# Patient Record
Sex: MALE | Race: BLACK OR AFRICAN AMERICAN | NOT HISPANIC OR LATINO | Employment: OTHER | URBAN - METROPOLITAN AREA
[De-identification: names, ages, dates, MRNs, and addresses within clinical notes are randomized per-mention and may not be internally consistent; named-entity substitution may affect disease eponyms.]

---

## 2021-08-31 ENCOUNTER — HOSPITAL ENCOUNTER (EMERGENCY)
Facility: HOSPITAL | Age: 56
Discharge: HOME | End: 2021-08-31
Attending: EMERGENCY MEDICINE
Payer: MEDICAID

## 2021-08-31 VITALS
DIASTOLIC BLOOD PRESSURE: 72 MMHG | OXYGEN SATURATION: 97 % | TEMPERATURE: 98 F | RESPIRATION RATE: 16 BRPM | SYSTOLIC BLOOD PRESSURE: 131 MMHG | HEART RATE: 70 BPM

## 2021-08-31 DIAGNOSIS — R41.82 AMS (ALTERED MENTAL STATUS): Primary | ICD-10-CM

## 2021-08-31 DIAGNOSIS — F10.920 ALCOHOLIC INTOXICATION WITHOUT COMPLICATION: ICD-10-CM

## 2021-08-31 LAB
ALBUMIN SERPL BCP-MCNC: 3.9 G/DL (ref 3.5–5.2)
ALP SERPL-CCNC: 56 U/L (ref 55–135)
ALT SERPL W/O P-5'-P-CCNC: 17 U/L (ref 10–44)
ANION GAP SERPL CALC-SCNC: 12 MMOL/L (ref 8–16)
AST SERPL-CCNC: 27 U/L (ref 10–40)
BASOPHILS # BLD AUTO: 0.01 K/UL (ref 0–0.2)
BASOPHILS NFR BLD: 0.1 % (ref 0–1.9)
BILIRUB SERPL-MCNC: 0.4 MG/DL (ref 0.1–1)
BUN SERPL-MCNC: 22 MG/DL (ref 6–20)
CALCIUM SERPL-MCNC: 9.3 MG/DL (ref 8.7–10.5)
CHLORIDE SERPL-SCNC: 106 MMOL/L (ref 95–110)
CO2 SERPL-SCNC: 22 MMOL/L (ref 23–29)
CREAT SERPL-MCNC: 1 MG/DL (ref 0.5–1.4)
CTP QC/QA: YES
DIFFERENTIAL METHOD: ABNORMAL
EOSINOPHIL # BLD AUTO: 0 K/UL (ref 0–0.5)
EOSINOPHIL NFR BLD: 0.1 % (ref 0–8)
ERYTHROCYTE [DISTWIDTH] IN BLOOD BY AUTOMATED COUNT: 12.1 % (ref 11.5–14.5)
EST. GFR  (AFRICAN AMERICAN): >60 ML/MIN/1.73 M^2
EST. GFR  (NON AFRICAN AMERICAN): >60 ML/MIN/1.73 M^2
ETHANOL SERPL-MCNC: 256 MG/DL
GLUCOSE SERPL-MCNC: 70 MG/DL (ref 70–110)
HCT VFR BLD AUTO: 42.4 % (ref 40–54)
HGB BLD-MCNC: 14.5 G/DL (ref 14–18)
IMM GRANULOCYTES # BLD AUTO: 0.02 K/UL (ref 0–0.04)
IMM GRANULOCYTES NFR BLD AUTO: 0.3 % (ref 0–0.5)
LYMPHOCYTES # BLD AUTO: 1.3 K/UL (ref 1–4.8)
LYMPHOCYTES NFR BLD: 19.1 % (ref 18–48)
MCH RBC QN AUTO: 34.2 PG (ref 27–31)
MCHC RBC AUTO-ENTMCNC: 34.2 G/DL (ref 32–36)
MCV RBC AUTO: 100 FL (ref 82–98)
MONOCYTES # BLD AUTO: 0.4 K/UL (ref 0.3–1)
MONOCYTES NFR BLD: 5.5 % (ref 4–15)
NEUTROPHILS # BLD AUTO: 5.1 K/UL (ref 1.8–7.7)
NEUTROPHILS NFR BLD: 74.9 % (ref 38–73)
NRBC BLD-RTO: 0 /100 WBC
PLATELET # BLD AUTO: 256 K/UL (ref 150–450)
PMV BLD AUTO: 9.1 FL (ref 9.2–12.9)
POTASSIUM SERPL-SCNC: 3.7 MMOL/L (ref 3.5–5.1)
PROT SERPL-MCNC: 7.5 G/DL (ref 6–8.4)
RBC # BLD AUTO: 4.24 M/UL (ref 4.6–6.2)
SARS-COV-2 RDRP RESP QL NAA+PROBE: NEGATIVE
SODIUM SERPL-SCNC: 140 MMOL/L (ref 136–145)
WBC # BLD AUTO: 6.85 K/UL (ref 3.9–12.7)

## 2021-08-31 PROCEDURE — 82077 ASSAY SPEC XCP UR&BREATH IA: CPT | Performed by: EMERGENCY MEDICINE

## 2021-08-31 PROCEDURE — 99285 EMERGENCY DEPT VISIT HI MDM: CPT | Mod: CS,,, | Performed by: EMERGENCY MEDICINE

## 2021-08-31 PROCEDURE — 99285 PR EMERGENCY DEPT VISIT,LEVEL V: ICD-10-PCS | Mod: CS,,, | Performed by: EMERGENCY MEDICINE

## 2021-08-31 PROCEDURE — 93010 EKG 12-LEAD: ICD-10-PCS | Mod: ,,, | Performed by: INTERNAL MEDICINE

## 2021-08-31 PROCEDURE — 99284 EMERGENCY DEPT VISIT MOD MDM: CPT | Mod: 25

## 2021-08-31 PROCEDURE — 80053 COMPREHEN METABOLIC PANEL: CPT | Performed by: EMERGENCY MEDICINE

## 2021-08-31 PROCEDURE — 93010 ELECTROCARDIOGRAM REPORT: CPT | Mod: ,,, | Performed by: INTERNAL MEDICINE

## 2021-08-31 PROCEDURE — 85025 COMPLETE CBC W/AUTO DIFF WBC: CPT | Performed by: EMERGENCY MEDICINE

## 2021-08-31 PROCEDURE — 86803 HEPATITIS C AB TEST: CPT | Performed by: EMERGENCY MEDICINE

## 2021-08-31 PROCEDURE — U0002 COVID-19 LAB TEST NON-CDC: HCPCS | Performed by: EMERGENCY MEDICINE

## 2021-08-31 PROCEDURE — 63600175 PHARM REV CODE 636 W HCPCS: Performed by: EMERGENCY MEDICINE

## 2021-08-31 PROCEDURE — 93005 ELECTROCARDIOGRAM TRACING: CPT

## 2021-08-31 PROCEDURE — 96374 THER/PROPH/DIAG INJ IV PUSH: CPT

## 2021-08-31 PROCEDURE — 87389 HIV-1 AG W/HIV-1&-2 AB AG IA: CPT | Performed by: EMERGENCY MEDICINE

## 2021-08-31 RX ORDER — ONDANSETRON 2 MG/ML
4 INJECTION INTRAMUSCULAR; INTRAVENOUS
Status: COMPLETED | OUTPATIENT
Start: 2021-08-31 | End: 2021-08-31

## 2021-08-31 RX ADMIN — ONDANSETRON 4 MG: 2 INJECTION INTRAMUSCULAR; INTRAVENOUS at 04:08

## 2021-09-02 LAB
HCV AB SERPL QL IA: NEGATIVE
HIV 1+2 AB+HIV1 P24 AG SERPL QL IA: NEGATIVE

## 2023-03-04 ENCOUNTER — HOSPITAL ENCOUNTER (EMERGENCY)
Facility: HOSPITAL | Age: 58
Discharge: HOME | End: 2023-03-04
Attending: EMERGENCY MEDICINE
Payer: MEDICAID

## 2023-03-04 VITALS
DIASTOLIC BLOOD PRESSURE: 90 MMHG | HEART RATE: 79 BPM | OXYGEN SATURATION: 95 % | TEMPERATURE: 99 F | BODY MASS INDEX: 25.03 KG/M2 | RESPIRATION RATE: 18 BRPM | WEIGHT: 169 LBS | HEIGHT: 69 IN | SYSTOLIC BLOOD PRESSURE: 152 MMHG

## 2023-03-04 DIAGNOSIS — R07.9 CHEST PAIN: ICD-10-CM

## 2023-03-04 LAB
ALBUMIN SERPL BCP-MCNC: 3.8 G/DL (ref 3.5–5.2)
ALP SERPL-CCNC: 63 U/L (ref 55–135)
ALT SERPL W/O P-5'-P-CCNC: 48 U/L (ref 10–44)
ANION GAP SERPL CALC-SCNC: 14 MMOL/L (ref 8–16)
AST SERPL-CCNC: 78 U/L (ref 10–40)
BASOPHILS # BLD AUTO: 0.01 K/UL (ref 0–0.2)
BASOPHILS NFR BLD: 0.1 % (ref 0–1.9)
BILIRUB SERPL-MCNC: 1.2 MG/DL (ref 0.1–1)
BILIRUB UR QL STRIP: NEGATIVE
BNP SERPL-MCNC: 31 PG/ML (ref 0–99)
BUN SERPL-MCNC: 16 MG/DL (ref 6–20)
CALCIUM SERPL-MCNC: 10.2 MG/DL (ref 8.7–10.5)
CHLORIDE SERPL-SCNC: 100 MMOL/L (ref 95–110)
CLARITY UR REFRACT.AUTO: CLEAR
CO2 SERPL-SCNC: 24 MMOL/L (ref 23–29)
COLOR UR AUTO: YELLOW
CREAT SERPL-MCNC: 0.9 MG/DL (ref 0.5–1.4)
DIFFERENTIAL METHOD: ABNORMAL
EOSINOPHIL # BLD AUTO: 0 K/UL (ref 0–0.5)
EOSINOPHIL NFR BLD: 0 % (ref 0–8)
ERYTHROCYTE [DISTWIDTH] IN BLOOD BY AUTOMATED COUNT: 12.4 % (ref 11.5–14.5)
EST. GFR  (NO RACE VARIABLE): >60 ML/MIN/1.73 M^2
GLUCOSE SERPL-MCNC: 89 MG/DL (ref 70–110)
GLUCOSE UR QL STRIP: NEGATIVE
HCT VFR BLD AUTO: 41.3 % (ref 40–54)
HCV AB SERPL QL IA: NORMAL
HGB BLD-MCNC: 14.3 G/DL (ref 14–18)
HGB UR QL STRIP: NEGATIVE
HIV 1+2 AB+HIV1 P24 AG SERPL QL IA: NORMAL
IMM GRANULOCYTES # BLD AUTO: 0.03 K/UL (ref 0–0.04)
IMM GRANULOCYTES NFR BLD AUTO: 0.3 % (ref 0–0.5)
INFLUENZA A, MOLECULAR: NOT DETECTED
INFLUENZA B, MOLECULAR: NOT DETECTED
KETONES UR QL STRIP: ABNORMAL
LEUKOCYTE ESTERASE UR QL STRIP: NEGATIVE
LYMPHOCYTES # BLD AUTO: 0.6 K/UL (ref 1–4.8)
LYMPHOCYTES NFR BLD: 5.4 % (ref 18–48)
MCH RBC QN AUTO: 36.5 PG (ref 27–31)
MCHC RBC AUTO-ENTMCNC: 34.6 G/DL (ref 32–36)
MCV RBC AUTO: 105 FL (ref 82–98)
MONOCYTES # BLD AUTO: 0.4 K/UL (ref 0.3–1)
MONOCYTES NFR BLD: 4 % (ref 4–15)
NEUTROPHILS # BLD AUTO: 9.6 K/UL (ref 1.8–7.7)
NEUTROPHILS NFR BLD: 90.2 % (ref 38–73)
NITRITE UR QL STRIP: NEGATIVE
NRBC BLD-RTO: 0 /100 WBC
PH UR STRIP: 7 [PH] (ref 5–8)
PLATELET # BLD AUTO: 261 K/UL (ref 150–450)
PMV BLD AUTO: 9.1 FL (ref 9.2–12.9)
POTASSIUM SERPL-SCNC: 3.7 MMOL/L (ref 3.5–5.1)
PROT SERPL-MCNC: 8 G/DL (ref 6–8.4)
PROT UR QL STRIP: ABNORMAL
RBC # BLD AUTO: 3.92 M/UL (ref 4.6–6.2)
RSV AG BY MOLECULAR METHOD: NOT DETECTED
SARS-COV-2 RNA RESP QL NAA+PROBE: NOT DETECTED
SODIUM SERPL-SCNC: 138 MMOL/L (ref 136–145)
SP GR UR STRIP: 1.02 (ref 1–1.03)
TROPONIN I SERPL DL<=0.01 NG/ML-MCNC: 0.01 NG/ML (ref 0–0.03)
URN SPEC COLLECT METH UR: ABNORMAL
WBC # BLD AUTO: 10.64 K/UL (ref 3.9–12.7)

## 2023-03-04 PROCEDURE — 80053 COMPREHEN METABOLIC PANEL: CPT | Performed by: EMERGENCY MEDICINE

## 2023-03-04 PROCEDURE — 96374 THER/PROPH/DIAG INJ IV PUSH: CPT

## 2023-03-04 PROCEDURE — 99285 EMERGENCY DEPT VISIT HI MDM: CPT | Mod: 25

## 2023-03-04 PROCEDURE — 63600175 PHARM REV CODE 636 W HCPCS: Performed by: EMERGENCY MEDICINE

## 2023-03-04 PROCEDURE — 81003 URINALYSIS AUTO W/O SCOPE: CPT | Performed by: EMERGENCY MEDICINE

## 2023-03-04 PROCEDURE — 85025 COMPLETE CBC W/AUTO DIFF WBC: CPT | Performed by: EMERGENCY MEDICINE

## 2023-03-04 PROCEDURE — 83880 ASSAY OF NATRIURETIC PEPTIDE: CPT | Performed by: EMERGENCY MEDICINE

## 2023-03-04 PROCEDURE — 86803 HEPATITIS C AB TEST: CPT | Performed by: EMERGENCY MEDICINE

## 2023-03-04 PROCEDURE — 84484 ASSAY OF TROPONIN QUANT: CPT | Performed by: EMERGENCY MEDICINE

## 2023-03-04 PROCEDURE — 99284 PR EMERGENCY DEPT VISIT,LEVEL IV: ICD-10-PCS | Mod: CS,,, | Performed by: EMERGENCY MEDICINE

## 2023-03-04 PROCEDURE — 25000003 PHARM REV CODE 250: Performed by: EMERGENCY MEDICINE

## 2023-03-04 PROCEDURE — 87389 HIV-1 AG W/HIV-1&-2 AB AG IA: CPT | Performed by: EMERGENCY MEDICINE

## 2023-03-04 PROCEDURE — 0241U SARS-COV2 (COVID) WITH FLU/RSV BY PCR: CPT | Performed by: EMERGENCY MEDICINE

## 2023-03-04 PROCEDURE — 99284 EMERGENCY DEPT VISIT MOD MDM: CPT | Mod: CS,,, | Performed by: EMERGENCY MEDICINE

## 2023-03-04 PROCEDURE — 96361 HYDRATE IV INFUSION ADD-ON: CPT

## 2023-03-04 PROCEDURE — 93005 ELECTROCARDIOGRAM TRACING: CPT

## 2023-03-04 PROCEDURE — 93010 ELECTROCARDIOGRAM REPORT: CPT | Mod: ,,, | Performed by: INTERNAL MEDICINE

## 2023-03-04 PROCEDURE — 93010 EKG 12-LEAD: ICD-10-PCS | Mod: ,,, | Performed by: INTERNAL MEDICINE

## 2023-03-04 RX ORDER — ACETAMINOPHEN 500 MG
1000 TABLET ORAL
Status: COMPLETED | OUTPATIENT
Start: 2023-03-04 | End: 2023-03-04

## 2023-03-04 RX ORDER — KETOROLAC TROMETHAMINE 30 MG/ML
10 INJECTION, SOLUTION INTRAMUSCULAR; INTRAVENOUS
Status: COMPLETED | OUTPATIENT
Start: 2023-03-04 | End: 2023-03-04

## 2023-03-04 RX ADMIN — KETOROLAC TROMETHAMINE 10 MG: 30 INJECTION, SOLUTION INTRAMUSCULAR; INTRAVENOUS at 04:03

## 2023-03-04 RX ADMIN — ACETAMINOPHEN 1000 MG: 500 TABLET ORAL at 04:03

## 2023-03-04 RX ADMIN — SODIUM CHLORIDE 1000 ML: 9 INJECTION, SOLUTION INTRAVENOUS at 04:03

## 2023-03-04 NOTE — ED TRIAGE NOTES
"Ben Gutierrez, a 57 y.o. male presents to the ED w/ complaint of chest pain and discomfort in his teeth.     Triage note:  Chief Complaint   Patient presents with    Altered Mental Status    Chest Pain     Pt was found at another person's trailer. Pt c/o chest pain x 2 weeks. + EtoH today.      Review of patient's allergies indicates:   Allergen Reactions    Haloperidol Other (See Comments)     "messes with my jaw"  Dystonic reaction       No past medical history on file.    "

## 2023-03-04 NOTE — ED PROVIDER NOTES
"Encounter Date: 3/4/2023       History     Chief Complaint   Patient presents with    Altered Mental Status    Chest Pain     Pt was found at another person's trailer. Pt c/o chest pain x 2 weeks. + EtoH today.      57-year-old male, with history of asthma and schizophrenia, brought to the ED by EMS for chest pain.  Per EMS report, patient was found sleeping on someone's trailer, upon the police arrival, patient complain of chest pain which prompted them to call paramedics for evaluation.  Per EMS, patient noticed to have laceration to his left pinna, and slurred speech, complaining of "double chest pain."Patient is alert and oriented. Patient stated he had chest pain for 2 weeks, on both sides of his chest, he thinks it's "muscle pain." Stated he took Risperdal and ibuprofen for it.  Denies shortness of breath, nausea, vomiting, diaphoretic. Patient endorses drinking alcohol daily, last drink was today.  Patient was not sure how he got laceration to his left ear, denies any recent injury.      Review of patient's allergies indicates:   Allergen Reactions    Haloperidol Other (See Comments)     "messes with my jaw"  Dystonic reaction       No past medical history on file.  No past surgical history on file.  No family history on file.     Review of Systems   Constitutional:  Negative for chills and fever.   Eyes:  Negative for pain and visual disturbance.   Respiratory:  Negative for cough and shortness of breath.    Cardiovascular:  Positive for chest pain. Negative for leg swelling.   Gastrointestinal:  Negative for abdominal pain, nausea and vomiting.   Genitourinary:  Negative for dysuria and flank pain.   Musculoskeletal:  Negative for back pain and neck pain.   Skin:  Negative for rash.   Neurological:  Positive for headaches. Negative for weakness.   Psychiatric/Behavioral:  Negative for confusion and decreased concentration.      Physical Exam     Initial Vitals [03/04/23 1559]   BP Pulse Resp Temp SpO2 "   132/83 85 14 (!) 100.7 °F (38.2 °C) 96 %      MAP       --         Physical Exam    Nursing note and vitals reviewed.  Constitutional: He appears well-developed. No distress.   HENT:   Head: Normocephalic and atraumatic.   Nose: Nose normal.   Healed abrasion along his left pinna   Eyes: Conjunctivae and EOM are normal. Pupils are equal, round, and reactive to light.   Neck: No JVD present.   Normal range of motion.  Cardiovascular:  Normal rate, regular rhythm and normal heart sounds.           Pulmonary/Chest: Breath sounds normal. No respiratory distress. He exhibits tenderness (Bilateral parasternal tenderness to palpation).   Abdominal: Abdomen is soft. He exhibits no distension. There is no abdominal tenderness.   Musculoskeletal:         General: No tenderness or edema. Normal range of motion.      Cervical back: Normal range of motion.     Neurological: He is alert and oriented to person, place, and time. He has normal strength. No cranial nerve deficit.   Skin: Skin is warm and dry. Capillary refill takes less than 2 seconds.       ED Course   Procedures  Labs Reviewed   CBC W/ AUTO DIFFERENTIAL - Abnormal; Notable for the following components:       Result Value    RBC 3.92 (*)      (*)     MCH 36.5 (*)     MPV 9.1 (*)     Gran # (ANC) 9.6 (*)     Lymph # 0.6 (*)     Gran % 90.2 (*)     Lymph % 5.4 (*)     All other components within normal limits   COMPREHENSIVE METABOLIC PANEL - Abnormal; Notable for the following components:    Total Bilirubin 1.2 (*)     AST 78 (*)     ALT 48 (*)     All other components within normal limits   URINALYSIS, REFLEX TO URINE CULTURE - Abnormal; Notable for the following components:    Protein, UA Trace (*)     Ketones, UA 1+ (*)     All other components within normal limits    Narrative:     Specimen Source->Urine   HIV 1 / 2 ANTIBODY    Narrative:     Release to patient->Immediate   HEPATITIS C ANTIBODY    Narrative:     Release to patient->Immediate   SARS-COV2  (COVID) WITH FLU/RSV BY PCR   B-TYPE NATRIURETIC PEPTIDE   TROPONIN I     EKG Readings: (Independently Interpreted)   Initial Reading: No STEMI. Rhythm: Normal Sinus Rhythm. Heart Rate: 84. Ectopy: No Ectopy. Conduction: Normal. ST Segments: Normal ST Segments. T Waves: Normal. Axis: Normal. Clinical Impression: Normal Sinus Rhythm     Imaging Results              X-Ray Chest PA And Lateral (Final result)  Result time 03/04/23 17:06:45      Final result by Dhaval Sheppard MD (03/04/23 17:06:45)                   Impression:      No acute process.      Electronically signed by: Dhaval Sheppard MD  Date:    03/04/2023  Time:    17:06               Narrative:    EXAMINATION:  XR CHEST PA AND LATERAL    CLINICAL HISTORY:  Chest pain, unspecified    TECHNIQUE:  PA and lateral views of the chest were performed.    COMPARISON:  None    FINDINGS:  Monitoring EKG leads are present.  The trachea is unremarkable.  The cardiomediastinal silhouette is within normal limits.  The hemidiaphragms are unremarkable.  There are no pleural effusions.  There is no evidence of a pneumothorax.  There is no evidence of pneumomediastinum.  No airspace opacity is present.  The osseous structures are unremarkable.                                    X-Rays:   Independently Interpreted Readings:   Chest X-Ray: Normal heart size.  No infiltrates.  No acute abnormalities.   Medications   sodium chloride 0.9% bolus 1,000 mL 1,000 mL (0 mLs Intravenous Stopped 3/4/23 1856)   ketorolac injection 9.999 mg (9.999 mg Intravenous Given 3/4/23 1652)   acetaminophen tablet 1,000 mg (1,000 mg Oral Given 3/4/23 1651)     Medical Decision Making:   History:   Old Medical Records: I decided to obtain old medical records.  Initial Assessment:   57-year-old male, with history of asthma and schizophrenia, brought to the ED by EMS for chest pain.  Patient is alert and oriented, appropriate behavior, vital signs reviewed, significant for fever 100.7, otherwise stable.   Physical exam significant for parasternal chest wall tenderness.  Differential Diagnosis:   Costochondritis, rib fracture, pneumonia, viral illness, ACS  Clinical Tests:   Lab Tests: Ordered and Reviewed  Radiological Study: Ordered and Reviewed  Medical Tests: Ordered and Reviewed           ED Course as of 03/04/23 2303   Sat Mar 04, 2023   1757 CBC auto differential(!)  No leukocytosis, or anemia [NC]   1757 Comprehensive metabolic panel(!)  No electrolyte derangement, normal renal function, elevated liver enzymes, likely from alcohol use. [NC]   1819 Troponin I: 0.006  Within normal limits [NC]   1819 BNP: 31  Within normal limits [NC]   1819 X-Ray Chest PA And Lateral   no consolidation, no cardiomegaly, or pleural effusion, no pneumothorax, no obvious rib fracture [NC]   2257 Patient is clinically sober, his chest pain resolved with pain management.  Likely from MSK etiology.  Patient remains stable, no hallucination, agitation, or suicidal ideation.  Stable to discharge home, states that his sister were pick him up.  Provided discharge instruction and return to the ED precaution.  No other question. [NC]      ED Course User Index  [NC] Antwon Mckinney MD                 Clinical Impression:   Final diagnoses:  [R07.9] Chest pain        ED Disposition Condition    Discharge Stable          ED Prescriptions    None       Follow-up Information       Follow up With Specialties Details Why Contact Info    [facility] - Emergency Dept Emergency Medicine  As needed [address]  [facility] [address] [phone]    [facility]    [address] [facility] [address]    [facility]    [address] [facility] [address]  [phone]               Antwon Mckinney MD  Resident  03/04/23 2303

## 2023-03-05 NOTE — ED NOTES
Pt care assumed. Report received by LEXA [place]. Pt lying in stretcher in low and locked position and side rails raised x2. Call light, pt's belongings, and bedside table within pt's reach. Pt on continuous cardiac monitoring, pulse oximetry, and BP cycling every 30 minutes. Pt in NAD and verbalized no needs at this time. Family member x2 at bedside.

## 2023-03-05 NOTE — DISCHARGE INSTRUCTIONS
You can take Tylenol and ibuprofen for pain.  Please follow-up with your primary care provider within 1 week for re-evaluation.  Return to ED if you have uncontrolled pain, vomiting, loss consciousness, or other concerns.

## 2023-03-06 ENCOUNTER — HOSPITAL ENCOUNTER (EMERGENCY)
Facility: OTHER | Age: 58
Discharge: HOME | End: 2023-03-06
Attending: EMERGENCY MEDICINE
Payer: MEDICAID

## 2023-03-06 VITALS
OXYGEN SATURATION: 95 % | RESPIRATION RATE: 16 BRPM | DIASTOLIC BLOOD PRESSURE: 90 MMHG | HEART RATE: 88 BPM | HEIGHT: 69 IN | BODY MASS INDEX: 25.18 KG/M2 | TEMPERATURE: 99 F | WEIGHT: 170 LBS | SYSTOLIC BLOOD PRESSURE: 138 MMHG

## 2023-03-06 DIAGNOSIS — F10.10 ALCOHOL ABUSE: ICD-10-CM

## 2023-03-06 DIAGNOSIS — F10.920 ACUTE ALCOHOLIC INTOXICATION WITHOUT COMPLICATION: Primary | ICD-10-CM

## 2023-03-06 PROCEDURE — 99283 EMERGENCY DEPT VISIT LOW MDM: CPT

## 2023-03-06 NOTE — ED PROVIDER NOTES
"Encounter Date: 3/6/2023    SCRIBE #1 NOTE: I, Jennifer See, am scribing for, and in the presence of,  Cam Crouch II, MD.     History     Chief Complaint   Patient presents with    Alcohol Intoxication     [employer] activated EMS for intoxicated patient found in parking lot of local business. Pt arousable to voice, c/o pain to teeth; pt noted to have R ankle and bilateral hand edema; nad noted      Patient is a 57-year-old male presents to the ED after being brought in by EMS for alcohol intoxication. Patient was brought in in by EMS after [employer] found him intoxicated in a parking lot. When speaking to him, speech is slurred.     The history is provided by the EMS personnel.   Review of patient's allergies indicates:   Allergen Reactions    Haloperidol Other (See Comments)     "messes with my jaw"  Dystonic reaction       No past medical history on file.  No past surgical history on file.  No family history on file.     Review of Systems   Constitutional:  Negative for chills, diaphoresis and fever.   HENT:  Negative for congestion, rhinorrhea and sore throat.    Eyes:  Negative for visual disturbance.   Respiratory:  Negative for cough and shortness of breath.    Cardiovascular:  Negative for chest pain and leg swelling.   Gastrointestinal:  Negative for abdominal pain, constipation, diarrhea, nausea and vomiting.   Genitourinary:  Negative for dysuria, frequency, hematuria, penile discharge and penile pain.   Musculoskeletal:  Negative for back pain.   Skin:  Negative for rash.   Neurological:  Positive for speech difficulty. Negative for dizziness, weakness and headaches.     Physical Exam     Initial Vitals [03/06/23 1429]   BP Pulse Resp Temp SpO2   (!) 107/58 95 18 98.4 °F (36.9 °C) (!) 93 %      MAP       --         Physical Exam    Constitutional: He appears well-developed and well-nourished. He is not diaphoretic. No distress.   Disheveled.  Odor of alcohol.  Slurred speech.   HENT:   Head: " Normocephalic.   Right Ear: External ear normal.   Left Ear: External ear normal.   Eyes: Conjunctivae and EOM are normal. Pupils are equal, round, and reactive to light. Right eye exhibits no discharge. Left eye exhibits no discharge.   Neck: Neck supple. No JVD present.   Normal range of motion.  Cardiovascular:  Normal rate, regular rhythm, normal heart sounds and intact distal pulses.     Exam reveals no gallop and no friction rub.       No murmur heard.  Pulmonary/Chest: Breath sounds normal. No respiratory distress. He has no wheezes. He has no rhonchi. He has no rales.   Abdominal: Abdomen is soft. Bowel sounds are normal. He exhibits no distension. There is no abdominal tenderness. There is no rebound and no guarding.   Musculoskeletal:         General: No tenderness or edema. Normal range of motion.      Cervical back: Normal range of motion and neck supple.     Neurological:   Not able to comply with detailed neuro exam, but moves all extremities equally.  No gross deficits.  Gait not assessed.   Skin: Skin is warm and dry. Capillary refill takes less than 2 seconds.       ED Course   Procedures  Labs Reviewed - No data to display     Patient presents by EMS after being found intoxicated in public.  There is no evidence of trauma.  Patient only current complaint is that he is cold.  Vital signs are stable.  No focal deficits on exam.  Clinical picture is consistent with alcohol intoxication.  Patient will be observed until clinically sober.  Care is turned over at 9:00 p.m..      Imaging Results    None          Medications - No data to display           Scribe Attestation:   Scribe #1: I performed the above scribed service and the documentation accurately describes the services I performed. I attest to the accuracy of the note.            Physician Attestation for Scribe: I, TLH, reviewed documentation as scribed in my presence, which is both accurate and complete.        Clinical Impression:   Final  diagnoses:  [F10.920] Acute alcoholic intoxication without complication (Primary)  [F10.10] Alcohol abuse               Cam Crouch II, MD  03/06/23 9608

## 2023-04-12 ENCOUNTER — HOSPITAL ENCOUNTER (EMERGENCY)
Facility: OTHER | Age: 58
Discharge: HOME | End: 2023-04-12
Attending: EMERGENCY MEDICINE
Payer: MEDICAID

## 2023-04-12 VITALS
WEIGHT: 165 LBS | HEART RATE: 78 BPM | SYSTOLIC BLOOD PRESSURE: 140 MMHG | BODY MASS INDEX: 24.44 KG/M2 | DIASTOLIC BLOOD PRESSURE: 88 MMHG | HEIGHT: 69 IN | RESPIRATION RATE: 18 BRPM | OXYGEN SATURATION: 99 % | TEMPERATURE: 99 F

## 2023-04-12 DIAGNOSIS — S60.446A: Primary | ICD-10-CM

## 2023-04-12 PROCEDURE — 99283 EMERGENCY DEPT VISIT LOW MDM: CPT

## 2023-04-12 PROCEDURE — 25000003 PHARM REV CODE 250: Performed by: NURSE PRACTITIONER

## 2023-04-12 RX ORDER — BACITRACIN ZINC 500 UNIT/G
1 OINTMENT (GRAM) TOPICAL
Status: COMPLETED | OUTPATIENT
Start: 2023-04-12 | End: 2023-04-12

## 2023-04-12 RX ADMIN — BACITRACIN ZINC 1 TUBE: 500 OINTMENT TOPICAL at 03:04

## 2023-04-12 NOTE — ED TRIAGE NOTES
Pt reports needing several teeth pulled, pt also reports rings are stuck on his right hand. Pt is alert and oriented, ambulatory, respirations are even unlabored. Pt is in NAD

## 2023-04-12 NOTE — ED PROVIDER NOTES
"SCRIBE #1 NOTE: I, Lynneve Harris, am scribing for, and in the presence of,  JOCELIN Hair.     Source of History:  Patient.    Chief complaint:  ring stuck on finger (Pt from [facility].  Pt c.o ring on 5th finger right hand 2 weeks. Pt states unable to get ring off.  AAO x 3 nadn skin w.d  )      HPI:  Ben Gutierrez is a 57 y.o. male presenting to the emergency department with a ring stuck on his 5th digit.  The patient states he has been wearing the ring for 1 month.  States now has increased pain which prompted him to present to emergency department.     This is the extent to the patients complaints today here in the emergency department.    PMH:  As per HPI and below:  Past Medical History:   Diagnosis Date    Arthritis      No past surgical history on file.    Social History     Tobacco Use    Smoking status: Every Day     Types: Cigarettes    Smokeless tobacco: Never   Substance Use Topics    Alcohol use: Yes       Review of patient's allergies indicates:   Allergen Reactions    Haloperidol Other (See Comments)     "messes with my jaw"  Dystonic reaction         ROS: As per HPI and below:  General: No fever.  No chills.  Eyes: No visual changes.   ENT: No sore throat. No ear pain.  Urinary: No abnormal urination.  MSK: No back pain.  Integument: Notes ring unable to be removed from 5th digit. No rashes or lesions.       Physical Exam:    BP (!) 140/88 (BP Location: Left arm, Patient Position: Sitting)   Pulse 78   Temp 98.6 °F (37 °C) (Oral)   Resp 18   Ht 5' 9" (1.753 m)   Wt 74.8 kg (165 lb)   SpO2 99%   BMI 24.37 kg/m²   Vitals:    04/12/23 1342 04/12/23 1507   BP: (!) 132/92 (!) 140/88   Pulse: 83 78   Resp: 18 18   Temp: 99 °F (37.2 °C) 98.6 °F (37 °C)   TempSrc: Oral Oral   SpO2: 96% 99%   Weight: 74.8 kg (165 lb)    Height: 5' 9" (1.753 m)        Nursing note and vital signs reviewed.  Appearance: No acute distress.  Eyes: No conjunctival injection.  Extraocular muscles are intact.  ENT: " Normal phonation.  Cardio:  Cap refill less than 2 seconds.  Musculoskeletal: Neck supple.  No meningismus.  Skin:  Gold color ring was noted to be stuck to his right pinky finger.  Removed easily with KY jelly.  Skin on the dorsum hand under the ring was macerated with skin slough.  No purulent drainage or erythema noted..  Neuro:  alert and oriented x3,  no focal neurological deficits.  Mental Status:  Alert and oriented x 3.  Appropriate, conversant.    Initial MDM:  57-year-old male with a ring stuck on his right finger for the last month.  Reported increased pain.  On exam no decreased circulation noted, the finger to touch and cap refill less than 2 seconds.  Patient also reports needs multiple teeth filled on exam there is extensive dental will teeth in the eating he will need follow-up with a dentist.  He stated understanding    Labs Reviewed - No data to display    No orders to display     Foreign Body    Date/Time: 4/12/2023 3:22 PM  Performed by: JOCELIN Hair  Authorized by: Domi Aquino MD   Body area: skin  General location: upper extremity  Location details: right small finger    Patient sedated: no  Patient restrained: no  Patient cooperative: yes  Localization method: visualized  Removal mechanism: KY jelly.  Dressing: antibiotic ointment and dressing applied  Tendon involvement: none  Complexity: simple  1 objects recovered.  Objects recovered: Ring  Post-procedure assessment: foreign body removed  Patient tolerance: Patient tolerated the procedure well with no immediate complications      MDM:    57 y.o. male with ring stuck on his finger x1 month.  Removed easily with KY jelly.  Skin macerated under the ring, it was cleaned and dressed with bacitracin.  Discussed cleaning it twice daily with soap water.  Low concern for cellulitis as there is no purulent drainage or erythema.  He was agreeable with this plan         Diagnostic Impression:    1. External constriction of right little finger,  initial encounter         ED Disposition Condition    Discharge Stable            Scribe Attestation:   Scribe #1: I performed the above scribed service and the documentation accurately describes the services I performed. I attest to the accuracy of the note.    Provider Attestation for Scribe: I, JOCELIN Betts, reviewed documentation as scribed in my presence, which is both accurate and complete.  ED Prescriptions    None       Follow-up Information       Follow up With Specialties Details Why Contact Info    [facility] - Emergency Dept Emergency Medicine Go to  [facility] wor[facility] [address] [facility] [address]  [phone]               JOCELIN Hair  04/12/23 1521

## 2023-04-12 NOTE — FIRST PROVIDER EVALUATION
" Emergency Department TeleTriage Encounter Note      CHIEF COMPLAINT    Chief Complaint   Patient presents with    ring stuck on finger     Pt from [facility].  Pt c.o ring on 5th finger right hand 2 weeks. Pt states unable to get ring off.  AAO x 3 nadn skin w.d       VITAL SIGNS   Initial Vitals [04/12/23 1342]   BP Pulse Resp Temp SpO2   (!) 132/92 83 18 99 °F (37.2 °C) 96 %      MAP       --          ALLERGIES    Review of patient's allergies indicates:   Allergen Reactions    Haloperidol Other (See Comments)     "messes with my jaw"  Dystonic reaction       PROVIDER TRIAGE NOTE  This is a teletriage evaluation of a 57 y.o. male presenting to the ED with c/o ring stuck on right 5th finger for 1 month.  Patient complains of "soreness" but no recent trauma or injury. Limited physical exam via telehealth: The patient is awake, alert, answering questions appropriately and is not in respiratory distress. Initial orders will be placed and care will be transferred to an alternate provider when patient is roomed for a full evaluation. Any additional orders and the final disposition will be determined by that provider.       ORDERS  Labs Reviewed - No data to display    ED Orders (720h ago, onward)      None              Virtual Visit Note: The provider triage portion of this emergency department evaluation and documentation was performed via [employer], a HIPAA-compliant telemedicine application, in concert with a tele-presenter in the room. A face to face patient evaluation with one of my colleagues will occur once the patient is placed in an emergency department room.      DISCLAIMER: This note was prepared with [employer] voice recognition transcription software. Garbled syntax, mangled pronouns, and other bizarre constructions may be attributed to that software system.    "
15

## 2023-04-16 ENCOUNTER — HOSPITAL ENCOUNTER (EMERGENCY)
Facility: OTHER | Age: 58
Discharge: HOME | End: 2023-04-16
Attending: EMERGENCY MEDICINE
Payer: MEDICAID

## 2023-04-16 VITALS
TEMPERATURE: 98 F | HEART RATE: 92 BPM | OXYGEN SATURATION: 100 % | SYSTOLIC BLOOD PRESSURE: 137 MMHG | RESPIRATION RATE: 16 BRPM | WEIGHT: 170 LBS | BODY MASS INDEX: 25.18 KG/M2 | DIASTOLIC BLOOD PRESSURE: 84 MMHG | HEIGHT: 69 IN

## 2023-04-16 DIAGNOSIS — L30.9 DERMATITIS: Primary | ICD-10-CM

## 2023-04-16 PROCEDURE — 25000003 PHARM REV CODE 250: Performed by: EMERGENCY MEDICINE

## 2023-04-16 PROCEDURE — 99283 EMERGENCY DEPT VISIT LOW MDM: CPT | Mod: 25

## 2023-04-16 RX ORDER — NEOMYCIN SULFATE, POLYMYXIN B SULFATE, BACITRACIN ZINC, HYDROCORTISONE 3.5; 10000; 400; 1 MG/G; [USP'U]/G; [USP'U]/G; MG/G
OINTMENT OPHTHALMIC 2 TIMES DAILY
Qty: 15 G | Refills: 0 | Status: SHIPPED | OUTPATIENT
Start: 2023-04-16 | End: 2023-04-20 | Stop reason: CLARIF

## 2023-04-16 RX ADMIN — BACITRACIN ZINC, NEOMYCIN SULFATE, POLYMYXIN B SULFATE 1 EACH: 3.5; 5000; 4 OINTMENT TOPICAL at 11:04

## 2023-04-17 NOTE — ED PROVIDER NOTES
"Encounter Date: 4/16/2023    SCRIBE #1 NOTE: I, Aydee Poole, am scribing for, and in the presence of,  Neena Cardoso MD. Other sections scribed: HPI, ROS, PE.     History     Chief Complaint   Patient presents with    Hand Pain     Pt is having pain to his right small digit - pt had a traumatic injury to the digit one week prior - pt wants to get something more for the pain -      This is a 57 y.o. male who presents with complaint of pain to his right small digit onset one week ago. The patient had a traumatic injury to the digit one week prior where he had an iron ring stuck on his finger that had to be removed with force. He was given antibiotic ointment and is requesting more. This is the extent of the patient's complaints at this time.          The history is provided by the patient.   Review of patient's allergies indicates:   Allergen Reactions    Haloperidol Other (See Comments)     "messes with my jaw"  Dystonic reaction       Past Medical History:   Diagnosis Date    Arthritis      No past surgical history on file.  No family history on file.  Social History     Tobacco Use    Smoking status: Every Day     Types: Cigarettes    Smokeless tobacco: Never   Substance Use Topics    Alcohol use: Yes     Review of Systems   Constitutional:  Negative for appetite change, chills, fatigue and fever.   HENT:  Negative for congestion, ear pain, facial swelling, hearing loss, rhinorrhea, sinus pressure, sneezing, sore throat and trouble swallowing.    Eyes:  Negative for discharge, redness, itching and visual disturbance.   Respiratory:  Negative for cough, chest tightness, shortness of breath and wheezing.    Cardiovascular:  Negative for chest pain and palpitations.   Gastrointestinal:  Negative for abdominal pain, blood in stool, constipation, diarrhea, nausea and vomiting.   Endocrine: Negative for polydipsia, polyphagia and polyuria.   Genitourinary:  Negative for dysuria, frequency, hematuria, penile " discharge and urgency.   Musculoskeletal:  Negative for arthralgias and gait problem.   Skin:  Positive for wound. Negative for color change and rash.        Positive for wound to the right small digit.    Neurological:  Negative for dizziness, seizures, syncope, weakness, numbness and headaches.   Psychiatric/Behavioral:  Negative for agitation, behavioral problems and confusion. The patient is not nervous/anxious.    All other systems reviewed and are negative.    Physical Exam     Initial Vitals [04/16/23 2014]   BP Pulse Resp Temp SpO2   137/84 92 16 98.1 °F (36.7 °C) 100 %      MAP       --         Physical Exam    Constitutional: He appears well-developed and well-nourished. He does not have a sickly appearance. No distress.   HENT:   Head: Normocephalic and atraumatic.   Right Ear: External ear normal.   Left Ear: External ear normal.   Eyes: Conjunctivae, EOM and lids are normal. Right eye exhibits no discharge. Left eye exhibits no discharge. Right conjunctiva is not injected. Right conjunctiva has no hemorrhage. Left conjunctiva is not injected. Left conjunctiva has no hemorrhage. No scleral icterus.   Neck: Phonation normal. No stridor present. No tracheal deviation present.   Normal range of motion.  Cardiovascular:  Normal rate, regular rhythm, normal heart sounds and intact distal pulses.     Exam reveals no gallop and no friction rub.       No murmur heard.  Pulses:       Radial pulses are 2+ on the right side and 2+ on the left side.        Dorsalis pedis pulses are 2+ on the right side and 2+ on the left side.   Pulmonary/Chest: Breath sounds normal. No respiratory distress. He has no wheezes. He has no rhonchi. He has no rales. He exhibits no tenderness.   Musculoskeletal:      Cervical back: Normal range of motion.     Neurological: He is alert and oriented to person, place, and time. He has normal strength. GCS eye subscore is 4. GCS verbal subscore is 5. GCS motor subscore is 6.   Skin: Skin is  warm and dry. No erythema.   Dry skin bilaterally. Pealing to the dorsal aspect of the 5th digit. Full range of motion. No erythema.    Psychiatric: He has a normal mood and affect. His speech is normal and behavior is normal. Judgment and thought content normal. Cognition and memory are normal.       ED Course   Procedures  Labs Reviewed - No data to display       Imaging Results    None          Medications   neomycin-bacitracnZn-polymyxnB packet 1 each (1 each Topical (Top) Given 4/16/23 0758)     Medical Decision Making:   History:   Old Medical Records: I decided to obtain old medical records.  Clinical Tests:   Lab Tests: Reviewed and Ordered  Radiological Study: Ordered and Reviewed  Medical Tests: Ordered and Reviewed  Additional MDM:   Comments: 57-year-old male presents for re-evaluation after having a ring removed from his right 5th digit.  At the time of his initial evaluation he had some macerated skin and had antibiotic ointment applied.  He presented today requesting additional antibiotic ointment.  He has no other complaints.  The skin appears to be healing well with no signs of a bacterial infection.  Prescription for bacitracin was given..      Scribe Attestation:   Scribe #1: I performed the above scribed service and the documentation accurately describes the services I performed. I attest to the accuracy of the note.            Physician Attestation for Scribe: I, Neena Cardoso , reviewed documentation as scribed in my presence, which is both accurate and complete.       Clinical Impression:   Final diagnoses:  [L30.9] Dermatitis (Primary)        ED Disposition Condition    Discharge Stable          ED Prescriptions       Medication Sig Dispense Start Date End Date Auth. Provider    neomycin-bacitracin-polymyxin-hydrocortisone (CORTISPORIN) ophthalmic ointment (Expires today) Place into both eyes 2 (two) times daily. 15 g 4/16/2023 4/20/2023 Neena Cardoso MD          Follow-up Information        Follow up With Specialties Details Why Contact Info    [facility]  Schedule an appointment as soon as possible for a visit   [address] [facility] [id number] [phone]               Neena Cardoso MD  04/20/23 2658

## 2023-04-23 ENCOUNTER — HOSPITAL ENCOUNTER (EMERGENCY)
Facility: OTHER | Age: 58
Discharge: HOME | End: 2023-04-23
Attending: EMERGENCY MEDICINE
Payer: MEDICAID

## 2023-04-23 VITALS
HEIGHT: 69 IN | HEART RATE: 88 BPM | BODY MASS INDEX: 26.66 KG/M2 | SYSTOLIC BLOOD PRESSURE: 120 MMHG | WEIGHT: 180 LBS | RESPIRATION RATE: 16 BRPM | OXYGEN SATURATION: 98 % | DIASTOLIC BLOOD PRESSURE: 70 MMHG | TEMPERATURE: 98 F

## 2023-04-23 DIAGNOSIS — F10.920 ACUTE ALCOHOLIC INTOXICATION WITHOUT COMPLICATION: Primary | ICD-10-CM

## 2023-04-23 PROCEDURE — 99283 EMERGENCY DEPT VISIT LOW MDM: CPT

## 2023-04-23 NOTE — ED PROVIDER NOTES
"  Source of History:  Medical record, patient, EMS personnel     Chief complaint:  Per triage note: "Alcohol Intoxication (Found in sleeping in front of someone's house +ETOH. CBG 92)  "    HPI:    Patient presents to the ED via EMS for altered mental status in the setting of admitted heavy alcohol use today. Per EMS, he was found sleeping on someone's lawn with people who know the patient. No reported or suspected trauma. Patient had urinary incontinence en route. No reported vomiting.     ROS:   See HPI for pertinent Review of Systems      Review of patient's allergies indicates:   Allergen Reactions    Haloperidol Other (See Comments)     "messes with my jaw"  Dystonic reaction         PMH:  As per HPI and below:  Past Medical History:   Diagnosis Date    Arthritis     Asthma     Chronic alcohol use     Mood disorder        History reviewed. No pertinent surgical history.    Social History     Tobacco Use    Smoking status: Every Day     Types: Cigarettes    Smokeless tobacco: Never   Substance Use Topics    Alcohol use: Yes       Physical Exam:      Nursing note and vitals reviewed.  /70 (BP Location: Right arm, Patient Position: Sitting)   Pulse 88   Temp 98.1 °F (36.7 °C) (Oral)   Resp 16   Ht 5' 9" (1.753 m)   Wt 81.6 kg (180 lb)   SpO2 98%   BMI 26.58 kg/m²     Constitutional: clinically intoxicated. No distress. Sealed can of alcoholic [place] at bedside.  Eyes: EOMI. No discharge. Anicteric.  HENT:   Neck: Normal range of motion. Neck supple.  No midline spinal tenderness, step-offs, or deformities.  Cardiovascular: Normal rate. No murmur, no gallop and no friction rub heard.   Pulmonary/Chest: No respiratory distress. Effort normal. No wheezes, no rales, no rhonchi.   Abdominal: Bowel sounds normal. Soft. No distension and no mass. There is no tenderness.   Musculoskeletal: Normal range of motion. No midline spinal tenderness, step-offs, or deformities.  Neurological: GCS 14. Somnulent. " No gross cranial nerve, light touch or strength deficit.   Skin: Skin is warm and dry.   EXT: 2+ radial pulses.   Psychiatric: Clinically intoxicated.     Medical Decision Making / Independent Interpretations / External Records Reviewed:      ED Course as of 04/25/23 1751   Sun Apr 23, 2023   1654 External notes and sources reviewed: external hospital emergency department encounter.    [RC]   1701 Pt is a 57 y.o. M with axis I mood disorder, asthma, chronic alcohol use, frequent emergency department encounters due to acute alcohol intoxication who presents with altered mental status in the setting of admitted alcohol ingestion at [facility] Sunday event.  No reported trauma.  On exam, pt clinically intoxicated, somnulent,  belligerent when awakened, incontinent of urine, resisted exam. Detailed exam deferred for safety.   The initial differential included intoxication, withdrawal.  I doubt gross metabolic abnormality. [RC]   1815 Patient awake, alert, had normal steady gait, still has slurred speech.  Otherwise no acute findings on detailed physical exam. We will give him a meal tray to facilitate metabolization. I anticipate discharge.  [RC]   1908 Patient with no signs of acute intoxication or withdrawal.   --  I discussed with pt and/or guardian/caretaker that this evaluation in the ED does not suggest any emergent or life threatening medical condition requiring admission or further immediate intervention or diagnostics. Regardless, an unremarkable evaluation in the ED does not preclude the development or presence of a serious or life threatening condition. Pt was instructed to return for any worsening, new, changed, or concerning symptoms.     I had a detailed discussion with patient and/or guardian/caretaker regarding findings, plan, return precautions, importance of medication adherence, need to follow-up with a PCP. All questions answered.     Note was created using voice recognition software.  It may have occasional typographical errors not identified and edited despite initial review prior to signing.   [RC]      ED Course User Index  [RC] Ji Fofana MD     Medications - No data to display         ---  I, Lynda Ibrahim, scribed for, and in the presence of, Dr. Fofana. I performed the scribed service and the documentation accurately describes the services I performed. I attest to the accuracy of the note.     Physician Attestation for Scribe:   I, Ji Fofana MD, reviewed documentation as scribed in my presence, which is both accurate and complete.    Diagnostic Impression:    1. Acute alcoholic intoxication without complication         ED Disposition Condition    Discharge Good          No future appointments.   ED Prescriptions    None       Follow-up Information       Follow up With Specialties Details Why Contact Info    Your primary care doctor  In 2 days For recheck with your primary care doctor                Ji Fofana MD  04/25/23 3870

## 2023-04-23 NOTE — DISCHARGE INSTRUCTIONS
Thank you for letting us take care of you today! It was nice meeting you, and I hope you feel better soon.     Call your primary care doctor to make the first available appointment.     Keep all your medical appointments.     Take your regular medication as prescribed. Contact your primary care provider before running out of medication, or for any problems obtaining them.    Do not drive or operate heavy machinery while taking opioid or muscle relaxing medications. Examples include norco, percocet, xanax, valium, flexeril.     Overuse or long term use of pain and sedating medication may lead to addiction, dependence, organ failure, family and work problems, legal problems, accidental overdose, and death.    If you do not have health insurance, you probably can afford it:  Call [phone] ([facility] hotline) or go to www.[employer].[place].gov    Your evaluation in the ER does not suggest any emergent or life threatening medical condition requiring admission or immediate intervention beyond that provided in the ER.   However, the signs of a serious problem sometimes take more time to appear.     Do not hesitate to return to the ER if any of the following occur:    Weakness, dizziness, fainting, or loss of consciousness   Fever of 100.4ºF (38ºC) or higher  Any worse symptoms  Any new or concerning symptoms    To protect yourself and others from COVID19:  Get vaccinated.   Anyone over 6 months old is eligible for vaccination.   If your last dose was over 6 months ago, you are probably due for another shot.   Vaccination is shown to prevent getting sick, ending up in the hospital, or dying because of COVID19.     If not vaccinated or over a long time since your last dose:  Your shot is waiting for you. To get it:   Text your ZIP code to GETVAX ([id number]) or VACUNA ([id number]) in Faroese  call 311, or [phone], or [phone], or [phone],   go to www.vaccines.gov, or  Call your health provider    If exposed to someone with  "cold, flu, or COVID symptoms, consider quarantining or at least wearing a mask around others for at least 5 days.   If exposed to someone with COVID19, wear a mask around others for 10 days, and test yourself at 5 days if you have no symptoms.    Every [place] household is eligible to order 4 free at-home COVID-19 tests from www.covidtests.gov    Alcohol Intoxication  Alcohol intoxication occurs when you drink alcohol faster than your liver can remove it from your system. The following facts are important to remember:  It can take 10 minutes or more to start to feel the effects of a drink, so you can easily get more intoxicated than you intended.  One drink may be more than 1 serving of alcohol. Depending on the drink, it can be 2 to 4 servings.  It takes about an hour for your body to metabolize (clear) 1 serving. If you have more than 1 drink, it can take a couple of hours or more.  Many things affect how drinks will affect you, including whether youve eaten, how fast you drink, your size, how much you normally drink (or not), medicines you take, chronic diseases you have, and gender.  Signs and symptoms of alcohol poisoning  The following are signs and symptoms of alcohol poisoning:  Mild impairment  Reduced inhibitions  Slurred speech  Drowsiness  Decreased fine motor skills  Moderate impairment  Erratic behavior, aggression, depression  Impaired judgment  Confusion  Concentration difficulties  Coordination problems  Severe impairment  Vomiting  Seizures  Unconsciousness  Cold, clammy  Slow or irregular breathing  Hypothermia (low body temperature)  Coma  Health effects  Alcohol abuse causes health problems. Sometimes this can happen after only drinking a little." There is no set number of drinks or amount of alcohol that defines too much. The more you drink at one time, and the more frequently you drink determine both the short-term and long-term health effects. It affects all parts of your body and your health, " including your:  Brain. Alcohol is a central nervous system depressant. It can damage parts of the brain that affect your balance, memory, thinking, and emotions. It can cause memory loss, blackouts, depression, agitation, sleep cycle changes, and seizures. These changes may or may not be reversible.  Heart and vascular system. Alcohol affects multiple areas. It can damage heart muscle causing cardiomyopathy, which is a weakening and stretching of the heart muscle. This can lead to trouble breathing, an irregular heartbeat, atrial fibrillation, leg swelling, and heart failure. It makes the blood vessels stiffen causing hypertension (high blood pressure). All of these problems increase your risk of having heart attacks or strokes.  Liver. Alcohol causes fat to build up in the liver, affecting its normal function. This increases the risk for hepatitis, leading to abdominal pain, appetite loss, jaundice, bleeding problems, liver fibrosis, and cirrhosis. This in turn can affect your ability to fight off infections, and can cause diabetes. The liver changes prevent it from removing toxins in your blood that can cause encephalopathy. Signs of this are confusion, altered level of consciousness, personality changes, memory loss, seizures, coma, and death.  Pancreas. Alcohol can cause inflammation of the pancreas, or pancreatitis. This can cause pain in your abdomen, fever, and diabetes.  Immune system. Alcohol weakens your immune system in a number of ways. It suppresses your immune system making it harder to fight off infections and colds. You will also have a higher risk of certain infections like pneumonia and tuberculosis.  Cancer risk. Alcohol raises your risk of cancer of the mouth, esophagus, pharynx, larynx, liver, and breast.  Sexual function. Alcohol abuse can also lead to sexual problems.  Alcohol use during pregnancy may cause permanent damage to the growing baby.  Home care  The following guidelines will help  you care for yourself at home:  Don't drink any more alcohol.  Don't drive until all effects of the alcohol have worn off.  Don't operate machinery that can cause injuries.  Get lots of rest over the next few days. Drink plenty of water and other non-alcoholic liquids. Try to eat regular meals.  If you have been drinking heavily on a daily basis, you may go through alcohol withdrawal. The usual symptoms last 3 to 4 days and may include nervousness, shakiness, nausea, sweating, sleeplessness, and can even cause seizures and a serious withdrawal symptom called delirium tremens, or DTs. During this time, it is best that you stay with family or friends who can help and support you. You can also admit yourself to a residential detox program. If your symptoms are severe (seizures, severe shakiness, confusion), contact your doctor or call an ambulance for help (see below).   Follow-up care  If alcohol is a problem in your life, these are some organizations that can help you:  Alcoholics Anonymous offers support through a self-help fellowship. There are no dues or fees. See the Yellow Pages and call for time and place of meetings. Find AA online at www.aa.org.  [employer] offers support to families of alcohol users. Contact 111-858-9500, or online at www.al-anon.org.  [employer] [place] on Alcoholism and Drug Dependence can be reached at [phone], or online at www.ncadd.org.  There are also inpatient and residential alcohol detox programs. Check the Internet or phonebook Yellow Pages under Drug Abuse and Treatment Centers.  Call 911  Call 911 if any of these occur:  Trouble breathing or slow irregular breathing  Chest pain  Sudden weakness on one side of your body or sudden trouble speaking  Heavy bleeding or vomiting blood  Very drowsy or trouble awakening  Fainting or loss of consciousness  Rapid heart rate  Seizure  When to seek medical advice  Call your healthcare provider right away if any of these occur:  Severe  shakiness   Fever over 100.4º F (38.0º C)  Confusion or hallucinations (seeing, hearing, or feeling things that are not there)  Pain in your upper abdomen that gets worse  Repeated vomiting    For help with substance abuse problems:    If you do not have insurance contact [facility] at [phone].    You may also contact [facility] ([phone]) or [facility] ([phone]).    [facility] ([facility]) Crisis Services for problems with mental health (suicidal, overwhelmed, agitated, despressed, withdrawn, etc.), problems with drugs/alcohol, or developmental disabilities. Accepts uninsured and medicaid:   [phone] or [phone]   [facility] websites:   www.[facility].org/services/crisis   www.[facility].org     [facility]:   Accepts Medicaid and uninsured [place] residents.   Accepts detox walk-ins weekdays between 7 a.m. and 3 p.m. Acceptance is based on bed availability.  [place] PHOTO ID REQUIRED.   Requirements: must be at least 22 years old.   [facility] also has residential substance treatment/rehab programs after intial detox is accomplished.   [phone]   www.[facility].org       [facility] (men, at least 18 years old) & [facility] (women, at least 18 years old):   NO initial Detox. They don't do initial detox nor medically assisted withdrawals. Clients can go to their choice of initial detox at [facility], [facility], [place], [place], or wherever [facility] would advise.   Has long-term residential substance abuse treatment programs, where clients live and participate in intense rehabilitation.   [phone]   www.[facility].org     [facility]:   Accepts Medicaid, Medicare, , and many private insurances.  Call daily at 10:30 a.m. to see if a bed is available.   Usually has a waiting list, call for more info.  [phone] or [phone]   www.[employer]     [facility]:   [address], [place] [address]    [phone]   Accepts Medicaid (Amerihealth Caritas, Healthy Blue, and Aetna) and private insurances. Does not accept Medicare.     [facility] ([place], [place]):  [phone]  Accepts Medicaid and many private insurances.   Call daily between 8:30 and 9 a.m. to see if a bed is available.   Provides transportation to and from facility.    [facility]   [address] [place] [address]   [phone]   Accepts Medicaid, Medicare, , and many private insurances.   Call for more information.     [facility] ([place], [place]):  [phone]  Accepts Medicaid, uninsured, and many private insurances.   Usually has a waiting list, call for more info.     [facility] ([place], [place]):  [phone]  Accepts certain Medicaid plans and many private insurances.  Usually has a waiting list, call for more info.    [facility] ([place], [place]):  [address] [place] [address] [phone]    OUT-Patient resources:   [facility] (No age limits)   Offers intensive outpatient treatment, medically-assisted outpatient detox with suboxone, counseling, AA/12 Steps, etc  Accepts uninsured residents of [place], [place], or [facility]. Residents of other [facility] must contact the [facility] Services District in their [place] for options.   [facility] locations:   [place] [phone]   [place]: [phone]   [place]: [phone]           [place]:   Accepts insurances, cash, credit card or financing plan for payment  [phone]   In [place]: [phone]   In [place]: [phone]   ---- ----       ADDITIONAL Addiction & Mental Health RESOURCES:   Call 211 or the 24/7 [facility] Crisis Response Team at [phone].   Call for information on current local resources for addiction, suicide prevention, help on how to cope, obtain services, etc.       [facility] - Crisis Services (For [place] Residents needing care for mental health, addiction or  developmental disabilities)   [phone]   www.[facility].org     [facility] - Substance Abuse & Mental Health Services Administration (Educational, not a Crisis resource)   www.samhsa.org   Various meds used for M.A.T. (medically assisted treatment) of opioid addiction:   http://www.samhsa.gov/medication-assisted-treatment         Other Mental Health Clinics include:     [facility] [address] [phone]     [facility] [address] [phone]     [facility] [address] [phone]     [facility] [address] [phone]     Alcoholics Anonymous:  Go to www.[place].org for locations and times  Locations with multiple meetings per day:  [facility] - [address] ([facility])  [facility] - [address] ([facility])  [facility] - [address] ([facility])  [facility] - [address] ([place])    Anyone who is suicidal may receive immediate help by going to the ER, calling 911, going to Suicide.org or by calling 7-609-RLLBZPT. Suicide is preventable, and if you are feeling suicidal, you must get help.

## 2023-04-23 NOTE — ED TRIAGE NOTES
Patient presents to ED via EMS with +ETOH after being found in front of a stranger's house. On arrival patient sitting up in bed AAOx4, respirations E/UL, denies any complaints at this time.

## 2023-05-11 ENCOUNTER — HOSPITAL ENCOUNTER (EMERGENCY)
Facility: OTHER | Age: 58
Discharge: HOME | End: 2023-05-11
Attending: EMERGENCY MEDICINE
Payer: MEDICAID

## 2023-05-11 VITALS
TEMPERATURE: 98 F | RESPIRATION RATE: 18 BRPM | DIASTOLIC BLOOD PRESSURE: 66 MMHG | SYSTOLIC BLOOD PRESSURE: 104 MMHG | HEART RATE: 97 BPM | WEIGHT: 170 LBS | OXYGEN SATURATION: 96 % | BODY MASS INDEX: 25.1 KG/M2

## 2023-05-11 DIAGNOSIS — Z20.822 LAB TEST NEGATIVE FOR COVID-19 VIRUS: ICD-10-CM

## 2023-05-11 DIAGNOSIS — Z11.1 SCREENING-PULMONARY TB: ICD-10-CM

## 2023-05-11 DIAGNOSIS — Z11.1 NORMAL SCREENING CHEST X-RAY FOR TUBERCULOSIS: Primary | ICD-10-CM

## 2023-05-11 LAB
CTP QC/QA: YES
SARS-COV-2 RDRP RESP QL NAA+PROBE: NEGATIVE

## 2023-05-11 PROCEDURE — 99283 EMERGENCY DEPT VISIT LOW MDM: CPT | Mod: 25

## 2023-05-11 NOTE — ED PROVIDER NOTES
"Source of History:  Patient    Chief complaint:  medical clearance (Requesting CXR and COVID test for medical clearance at shelter. Denies any symptoms. VSS)      HPI:  Ben Gutierrez is a 57 y.o. male presenting with need for COVID and Chest xray to be allowed entrance into the [facility].  The patient denies any cough, congestion, fever/chills or any night sweats.      This is the extent to the patients complaints today here in the emergency department.    PMH:  As per HPI and below:  Past Medical History:   Diagnosis Date    Arthritis     Asthma     Chronic alcohol use     Mood disorder      No past surgical history on file.    Social History     Tobacco Use    Smoking status: Every Day     Types: Cigarettes    Smokeless tobacco: Never   Substance Use Topics    Alcohol use: Yes     Review of patient's allergies indicates:   Allergen Reactions    Haloperidol Other (See Comments)     "messes with my jaw"  Dystonic reaction         ROS: As per HPI and below:  General: No fever, No chills.  Eyes: No visual changes.  ENT: No Cough/congestion   Head:  No headache.    Chest:  No shortness of breath.  Cardiovascular: No chest pain.  Abdomen: No abdominal pain.  No nausea or vomiting.   Genito-Urinary: No abnormal urination.  Neurologic: No focal weakness.  No numbness.  MSK: no back pain.  Integument: No rashes or lesions.  Hematologic: No easy bruising.  Endocrine: No excessive thirst or urination.    Physical Exam:    /66 (BP Location: Left arm, Patient Position: Sitting)   Pulse 97   Temp 98.3 °F (36.8 °C) (Oral)   Resp 18   Wt 77.1 kg (170 lb)   SpO2 96%   BMI 25.10 kg/m²   Vitals:    05/11/23 1114 05/11/23 1310   BP: 104/66    Pulse: 98 97   Resp: 18 18   Temp: 98.3 °F (36.8 °C)    TempSrc: Oral    SpO2: (!) 94% 96%   Weight: 77.1 kg (170 lb)        Nursing note and vital signs reviewed.  Appearance: No acute distress.  Well-appearing, nontoxic  Eyes:  No conjunctival injection.  Extraocular muscles are " intact.  ENT: Oropharynx clear. No tonsillar exudate or swelling. Uvula midline and normal. No elevation of posterior oropharynx.  MM are pink and moist.   Bilateral TM's are pearly grey.  Lymph: No cervical lymphadenopathy.   Chest/ Respiratory:  Clear to auscultation bilaterally.  Good air movement.  No wheezes.  No rhonchi. No rales. No accessory muscle use.  Cardiovascular:  Regular rate and rhythm.  No murmurs. No gallops. No rubs.  Musculoskeletal: Neck supple.  No meningismus.  Skin: No rashes seen.  Good turgor.  No abrasions.  No ecchymoses.  Neuro: alert and oriented x3,  no focal neurological deficits.  Psych: Appropriate, conversant    Labs that have been ordered have been independently reviewed and interpreted by myself.  Labs Reviewed   SARS-COV-2 RDRP GENE       X-Ray Chest 1 View   Final Result      1. No acute cardiopulmonary process, stable chronic findings.         Electronically signed by: Ismael Ortiz MD   Date:    05/11/2023   Time:    12:51            Initial Impression/ Differential Dx:  Differential Diagnosis includes, but is not limited to:  meningitis, nasal foreign body, otitis media/external, bacterial sinusitis, allergic rhinitis, influenza, bacterial/viral pharyngitis, bacterial/viral pneumonia, covid-19      MDM:    57 y.o. male with need for COVID test and cxr to be allowed into the [facility], negative covid swab in the ED and negative screening cxr.           Diagnostic Impression:    1. Normal screening chest x-ray for tuberculosis    2. Screening-pulmonary TB    3. Lab test negative for COVID-19 virus         ED Disposition Condition    Discharge Stable            ED Prescriptions    None       Follow-up Information       Follow up With Specialties Details Why Contact Info    [facility] - Emergency Dept Emergency Medicine Go to  If symptoms worsen [address] [id number] [phone]                 Tram Isabel, JOCELIN  05/11/23 0709

## 2023-05-11 NOTE — ED TRIAGE NOTES
57 YOM presents to ED with c/o requesting CXR and COVID test for medical clearance at shelter. Denies any symptoms. A&Ox4. Denies any other complaints.     LOC: The patient is awake, alert and aware of environment with an appropriate affect, the patient is oriented x 3.  APPEARANCE: Patient resting comfortably and in no acute distress, patient is clean and well groomed, patient's clothing is properly fastened.  SKIN: The skin is warm and dry, patient has normal skin turgor and moist mucus membranes, skin intact, no breakdown or brusing noted.  MUSKULOSKELETAL: Patient moving all extremities well, no obvious swelling or deformities noted.  RESPIRATORY: Airway is open and patent, respirations are spontaneous, patient has a normal effort and rate.  CARDIAC: No peripheral edema.  ABDOMEN: Soft and no tenderness to palpation, no distention noted.     ED workup in progress. VSS. Safety measures in place; side rails up x2. Call light within pt reach. Will continue to monitor.

## 2023-12-26 ENCOUNTER — HOSPITAL ENCOUNTER (EMERGENCY)
Facility: HOSPITAL | Age: 58
Discharge: HOME | End: 2023-12-26
Attending: EMERGENCY MEDICINE
Payer: MEDICAID

## 2023-12-26 VITALS
HEART RATE: 90 BPM | OXYGEN SATURATION: 97 % | DIASTOLIC BLOOD PRESSURE: 73 MMHG | HEIGHT: 69 IN | WEIGHT: 170 LBS | TEMPERATURE: 98 F | SYSTOLIC BLOOD PRESSURE: 135 MMHG | RESPIRATION RATE: 18 BRPM | BODY MASS INDEX: 25.18 KG/M2

## 2023-12-26 DIAGNOSIS — F10.929 ACUTE ALCOHOL INTOXICATION: ICD-10-CM

## 2023-12-26 DIAGNOSIS — F10.920 ALCOHOLIC INTOXICATION WITHOUT COMPLICATION: Primary | ICD-10-CM

## 2023-12-26 PROCEDURE — 63600175 PHARM REV CODE 636 W HCPCS

## 2023-12-26 PROCEDURE — 96360 HYDRATION IV INFUSION INIT: CPT

## 2023-12-26 PROCEDURE — 96361 HYDRATE IV INFUSION ADD-ON: CPT

## 2023-12-26 PROCEDURE — 99284 EMERGENCY DEPT VISIT MOD MDM: CPT | Mod: 25

## 2023-12-26 RX ADMIN — SODIUM CHLORIDE, POTASSIUM CHLORIDE, SODIUM LACTATE AND CALCIUM CHLORIDE 1000 ML: 600; 310; 30; 20 INJECTION, SOLUTION INTRAVENOUS at 01:12

## 2023-12-26 NOTE — ED TRIAGE NOTES
"Ben Gutierrez, a 58 y.o. male presents to the ED w/ complaint of "intoxication just wants to sleep" per ems owner of shell station called to have patient removed. Obvious swelling noted to left side of mouth. Abrasion to left eyebrow. Pt refusing to answer questions - proceeds to laugh when questioned and states " I wanna sleep"  .  Triage note:  Chief Complaint   Patient presents with    Alcohol Intoxication     Found in front shell [employer] and refused to leave per owner; pt states he is sleepy. No complaints.      Review of patient's allergies indicates:   Allergen Reactions    Haloperidol Other (See Comments)     "messes with my jaw"  Dystonic reaction       Past Medical History:   Diagnosis Date    Arthritis     Asthma     Chronic alcohol use     Mood disorder       LOC: The patient is stuperous, drowsy, unaware of environment   APPEARANCE: Pt resting comfortably, in no acute distress, pt is soiled with heavy odor noted, clothing properly fastened  SKIN:The skin is warm and dry, color consistent with ethnicity, patient has normal skin turgor and moist mucus membranes, no bruising noted   RESPIRATORY:Airway is open and patent, respirations are spontaneous, patient has a normal effort and rate, no accessory muscle use noted. PT snoring on arrival with episodes of apnea noted  CARDIAC: Normal rate and rhythm, no peripheral edema noted, capillary refill < 3 seconds, bilateral radial pulses 2+.  ABDOMEN: Soft, non tender, non distended. Vomit present on patient breath.  NEUROLOGIC: PERRLA, facial expression is symmetrical, patient moving all extremities spontaneously, normal sensation in all extremities when touched with a finger.  Follows all commands appropriately  MUSCULOSKELETAL: Patient moving all extremities spontaneously, no obvious swelling or deformities noted.      "

## 2023-12-26 NOTE — DISCHARGE INSTRUCTIONS
Your CT scan shows an old fracture of your mandible.  Please follow up with the oral surgeon.    Follow-Up Plan:  - Follow-up with primary care doctor within 3 - 5 days  - Additional testing and/or evaluation as directed by your primary doctor    Return to the Emergency Department for symptoms including but not limited to: worsening symptoms, shortness of breath or chest pain, vomiting with inability to hold down fluids, fevers greater than 100.4°F, passing out/fainting/unconsciousness, or other concerning symptoms.

## 2023-12-26 NOTE — ED PROVIDER NOTES
"Encounter Date: 12/26/2023       History     Chief Complaint   Patient presents with    Alcohol Intoxication     [place] [employer] and refused to leave per owner; pt states he is sleepy. No complaints.      58-year-old male with past medical history of alcohol abuse with multiple presentations for alcohol intoxication now presenting with chief complaint of intoxication after being found outside a convenience store.  EMS was activated when a convenience store owner noted the patient sitting outside with multiple alcohol bottles in front of him.  Patient was altered and covered in vomit.  Upon arrival to the emergency department patient is clinically intoxicated and does not give linear answers but does not complain of any pain, nausea, or shortness of breath.    The history is provided by the EMS personnel.     Review of patient's allergies indicates:   Allergen Reactions    Haloperidol Other (See Comments)     "messes with my jaw"  Dystonic reaction       Past Medical History:   Diagnosis Date    Arthritis     Asthma     Chronic alcohol use     Mood disorder      No past surgical history on file.  No family history on file.  Social History     Tobacco Use    Smoking status: Every Day     Types: Cigarettes    Smokeless tobacco: Never   Substance Use Topics    Alcohol use: Yes     Review of Systems  See HPI    Physical Exam     Initial Vitals [12/26/23 0016]   BP Pulse Resp Temp SpO2   (!) 148/86 74 18 97.8 °F (36.6 °C) 97 %      MAP       --         Physical Exam    Nursing note and vitals reviewed.    Gen:  Clinically intoxicated, chronically ill-appearing, disheveled, appears older than stated age, no pallor, no jaundice, appears dehydrated with dry mucous membranes  Eye: EOMI, no scleral icterus, no periorbital edema or ecchymosis  Head: Normocephalic, swelling of upper lip on left side, no lesions, scalp appears normal  ENT: Neck supple, no stridor, no masses, no drooling or voice " changes  Bruising on anterior aspect of left upper lip with abrasions.  No loose teeth or palpable crepitus.  CVS: All distal pulses intact with normal rate and rhythm, no JVD, normal S1/S2, no murmur  Pulm: Normal breath sounds, no wheezes, rales or rhonchi, no increased work of breathing  Abd:  Nondistended, soft, nontender, no organomegaly, no CVAT  Ext: No edema, no lesions, rashes, or deformity  Neuro:   GCS11 (E2V4M5)  Examinable cranial nerves intact grossly  Pupils equal and reactive to light  RUE  - tone intact   RLE  - tone intact   LUE  - tone intact   LLE  - tone intact   Psych: Unable to assess      ED Course   Procedures  Labs Reviewed - No data to display       Imaging Results              X-Ray Chest AP Portable (Final result)  Result time 12/26/23 01:37:20      Final result by Hugo Puga DO (12/26/23 01:37:20)                   Impression:      Mild nonspecific interstitial prominence, may reflect mild pulmonary edema or pneumonitis versus chronic changes.      Electronically signed by: Hugo Puga  Date:    12/26/2023  Time:    01:37               Narrative:    EXAMINATION:  XR CHEST AP PORTABLE    CLINICAL HISTORY:  Alcohol use, unspecified with intoxication, unspecified    TECHNIQUE:  Single frontal view of the chest was performed.    COMPARISON:  05/11/2023.    FINDINGS:  The lungs are well expanded.  There is mild nonspecific interstitial prominence.  No focal consolidation.  The pleural spaces are clear. The cardiac silhouette is unremarkable. The visualized osseous structures are unremarkable.                                       CT Head Without Contrast (Final result)  Result time 12/26/23 01:08:12      Final result by Hugo Puga DO (12/26/23 01:08:12)                   Impression:      No acute intracranial abnormality.    Mucosal thickening of the left maxillary sinus.      Electronically signed by: Hugo Puga  Date:    12/26/2023  Time:    01:08                Narrative:    EXAMINATION:  CT HEAD WITHOUT CONTRAST    CLINICAL HISTORY:  Mental status change, unknown cause;fall;    TECHNIQUE:  Low dose axial CT images obtained throughout the head without intravenous contrast. Sagittal and coronal reconstructions were performed.    COMPARISON:  CT head dated 08/31/2021.    FINDINGS:  Ventricles and sulci are normal in size for age without evidence of hydrocephalus. No extra-axial blood or fluid collections.  The brain parenchyma is normal. No parenchymal mass, hemorrhage, edema or major vascular distribution infarct.    No calvarial fracture.  The scalp is unremarkable.  There is mucosal thickening of the left maxillary sinus.  The remaining paranasal sinuses and mastoid air cells are clear.                                       CT Maxillofacial Without Contrast (Final result)  Result time 12/26/23 01:25:07      Final result by Hugo Puga DO (12/26/23 01:25:07)                   Impression:      Subacute or remote appearing comminuted nonunited fracture of the right mandibular ramus.  Correlate with point tenderness and clinical history.  Otherwise, no acute fracture or dislocation of the maxillofacial structures.    Soft tissue thickening of the left aspect of the face.    Mucosal thickening of the left maxillary sinus.      Electronically signed by: Hugo Puga  Date:    12/26/2023  Time:    01:25               Narrative:    EXAMINATION:  CT MAXILLOFACIAL WITHOUT CONTRAST    CLINICAL HISTORY:  Facial trauma, blunt;    TECHNIQUE:  Low dose axial images, sagittal and coronal reformations were obtained through the face without intravenous contrast.    COMPARISON:  CT cervical spine dated 08/31/2021.    FINDINGS:  There is a subacute or remote appearing comminuted fracture of the right mandibular ramus.  No additional fractures are seen.  The nasal bones are intact. The nasal septum approximates midline.    The temporomandibular joints are unremarkable. There are  dental caries and periapical lucencies with scattered dental amalgam.    The orbits are unremarkable. The bilateral globes are symmetric and intact. There is no preseptal or post-septal fluid or hemorrhage.    There is mucosal thickening of the left maxillary sinus.  The remaining paranasal sinuses and mastoid air cells are clear.  There is soft tissue thickening of the left aspect of the face.                                       CT Cervical Spine Without Contrast (Final result)  Result time 12/26/23 01:28:44      Final result by Andrey Epps MD (12/26/23 01:28:44)                   Impression:      No CT evidence of acute displaced fracture or traumatic subluxation of the cervical spine.    Moderately advanced multilevel degenerative changes with varying degrees of bilateral neural foraminal narrowing.    Incompletely healed mildly comminuted fracture of the right mandibular ramus with anterior subluxation of the mandibular condyle relative to the fossa.  This is new from prior study of 08/31/2021, although is favored to be subacute or chronic in etiology given some degree of apparent callus formation.  Of note, multiple lucent fracture planes persist without evidence of healing as well as apparent areas of discontinuity between the mandibular condyle and mandibular body.  Appropriate follow-up is advised.    Additional findings as above.      Electronically signed by: Andrey Epps MD  Date:    12/26/2023  Time:    01:28               Narrative:    EXAMINATION:  CT CERVICAL SPINE WITHOUT CONTRAST    CLINICAL HISTORY:  Neck trauma, intoxicated or obtunded (Age >= 16y);    TECHNIQUE:  Low dose axial images, sagittal and coronal reformations were performed though the cervical spine.  Contrast was not administered.    COMPARISON:  08/31/2021    FINDINGS:  Cervical spine alignment appears within normal limits.  Vertebral body heights appear adequately maintained.  No definite acute displaced fracture  identified.  There is intervertebral disc space height loss at C6-C7.  There are varying degrees of bilateral neural foraminal narrowing throughout the cervical spine.  Findings are most pronounced at C6-C7 demonstrating a posterior disc osteophyte complex and bilateral uncovertebral spurring with severe bilateral neural foraminal narrowing.    The prevertebral soft tissues are not significantly thickened.  There is moderate mucosal thickening of the left maxillary sinus.  There is a mildly comminuted fracture involving the right mandibular ramus.  This is favored to be subacute or chronic in etiology noting some component of external callus formation is present, although multiple lucent fracture planes persist.  The mandibular condyle is anteriorly subluxed relative to the temporomandibular fossa.  There is odontogenic disease with multiple periapical lucencies and dental caries present.  Correlation with dental examination advised.    The trachea is midline and patent.  The visualized lung apices demonstrate biapical pleuroparenchymal scarring.  There is centrilobular and paraseptal emphysematous change.                                       Medications   lactated ringers bolus 1,000 mL (0 mLs Intravenous Stopped 12/26/23 0300)     Medical Decision Making  Initial assessment  58-year-old male presenting with intoxication. Patient is able to vocalise, breathing spontaneously, hemodynamically stable, oriented, moving all 4 limbs spontaneously.  Examination consistent with clinically intoxicated patient with swollen left upper lip.      Differential diagnosis  Intoxication with alcohol  Considered head injury      ED management  Patient was clinically intoxicated on presentation with history concerning for alcohol use outside of a convenience store.  Patient was given IV fluids.  Out of abundance of caution CT head/max face/C-spine ordered to evaluate for head injury which was consistent with remote mandibular  fracture and swelling of left upper lip without associated underlying maxillary fracture but no intracranial bleeding or gross skull fracture.  Chest x-ray without signs concerning for aspiration.  Patient woke up and was able to ambulate and was clinically less intoxicated.  Patient was discharged.      Amount and/or Complexity of Data Reviewed  Radiology: ordered. Decision-making details documented in ED Course.              Attending Attestation:   Physician Attestation Statement for Resident:  As the supervising MD   Physician Attestation Statement: I have personally seen and examined this patient.   I agree with the above history.  -:   As the supervising MD I agree with the above PE.     As the supervising MD I agree with the above treatment, course, plan, and disposition.    I have reviewed and agree with the residents interpretation of the following: x-rays and CT scans.  I have reviewed the following: old records at this facility.              ED Course as of 12/26/23 0534   Tue Dec 26, 2023   0022 BP(!): 148/86 [PM]   0022 Temp: 97.8 °F (36.6 °C)  All [PM]   0022 SpO2: 97 % [PM]   0022 Pulse: 74 [PM]   0113 CT Head Without Contrast  As per my interpretation there are no acute findings suggestive of infarcts, fractures, space-occupying lesions, or infection [PM]   0148 X-Ray Chest AP Portable [AP]   0403 CT Maxillofacial Without Contrast  As per my independent interpretation signs concerning for right-sided mandibular fracture which is subacute appearance [PM]   0403 CT Cervical Spine Without Contrast  As per my independent interpretation no signs concerning for fracture or dislocation. [PM]      ED Course User Index  [AP] Radha Kelly MD  [PM] Loc Lewis MD                           Clinical Impression:  Final diagnoses:  [F10.920] Alcoholic intoxication without complication (Primary)  [F10.929] Acute alcohol intoxication          ED Disposition Condition    Discharge Stable          ED  Prescriptions    None       Follow-up Information       Follow up With Specialties Details Why Contact Info    Your primary care doctor  Schedule an appointment as soon as possible for a visit       [facility] ORAL MAXILLOFACIAL SURGERY Oral Surgery Schedule an appointment as soon as possible for a visit   [address] [facility] [address]  [phone]             Loc Lewis MD  Resident  12/26/23 0534       Radha Kelly MD  12/26/23 0570

## 2024-01-17 ENCOUNTER — HOSPITAL ENCOUNTER (EMERGENCY)
Facility: OTHER | Age: 59
Discharge: HOME | End: 2024-01-17
Attending: EMERGENCY MEDICINE
Payer: MEDICAID

## 2024-01-17 VITALS
TEMPERATURE: 98 F | BODY MASS INDEX: 23.04 KG/M2 | HEART RATE: 95 BPM | OXYGEN SATURATION: 96 % | RESPIRATION RATE: 18 BRPM | DIASTOLIC BLOOD PRESSURE: 92 MMHG | SYSTOLIC BLOOD PRESSURE: 147 MMHG | WEIGHT: 156 LBS

## 2024-01-17 DIAGNOSIS — Z91.89 POOR DENTAL HYGIENE: ICD-10-CM

## 2024-01-17 DIAGNOSIS — F10.920 ALCOHOLIC INTOXICATION WITHOUT COMPLICATION: Primary | ICD-10-CM

## 2024-01-17 PROCEDURE — 99281 EMR DPT VST MAYX REQ PHY/QHP: CPT

## 2024-01-18 NOTE — DISCHARGE INSTRUCTIONS
You were seen in the ER.  You are being discharged with a dental resource sheet to follow up with a dentist for you were dental issues.  I am also providing you with resources to obtain help with your alcohol addiction.  You need to obtain treatment for your alcohol abuse, as this can cause serious health problems.  I am providing you with two primary care clinics above for you to establish care.  You may return to the ER for any new or worsening symptoms.

## 2024-01-18 NOTE — ED PROVIDER NOTES
"Encounter Date: 1/17/2024       History     Chief Complaint   Patient presents with    sleepy     Reports coming to ED to sleep, endorses going to every custodial house and hospital around the city to get sleep,unsuccessful,  pt reports daily alcohol use, last drink today.     58-year-old male with past medical history of substance use disorder, alcohol abuse presenting for evaluation of feeling sleepy.  Patient initially reported to nurse at triage that he was tired and came to the hospital to sleep.  On my evaluation, he states that he came because he wanted to have his teeth checked out.  He denies any dental pain, but states that his teeth have been discolored and he believes they need to be extracted.  He is requesting referral to dentist.  He does admit to alcohol use, most recent use today.  Reports drinking a pt of alcohol today.  He is requesting to be discharged with resources for alcohol abuse.  He states that he has a place to go sleep tonight, states that he can go to his brother's house.  Would like to be discharged to go home to go to sleep.  He denies any pain anywhere, denies any fever, chills, chest pain, shortness of breath.    The history is provided by the patient.     Review of patient's allergies indicates:   Allergen Reactions    Haloperidol Other (See Comments)     "messes with my jaw"  Dystonic reaction       Past Medical History:   Diagnosis Date    Arthritis     Asthma     Chronic alcohol use     Mood disorder      No past surgical history on file.  No family history on file.  Social History     Tobacco Use    Smoking status: Every Day     Types: Cigarettes    Smokeless tobacco: Never   Substance Use Topics    Alcohol use: Yes     Review of Systems   Constitutional:  Negative for chills and fever.   HENT:  Negative for sore throat.    Respiratory:  Negative for shortness of breath.    Cardiovascular:  Negative for chest pain.   Gastrointestinal:  Negative for abdominal pain.   Musculoskeletal: "  Negative for back pain.   Neurological:  Negative for headaches.       Physical Exam     Initial Vitals [01/17/24 2037]   BP Pulse Resp Temp SpO2   (!) 147/92 95 18 97.9 °F (36.6 °C) 96 %      MAP       --         Physical Exam    Nursing note and vitals reviewed.  Constitutional:   Patient is sitting up in chair, mildly disheveled.  Responds to questions appropriately.  Alert and oriented.  Mildly slurred speech.   HENT:   Head: Normocephalic and atraumatic.   Poor dental health, multiple broken teeth and cavities present.  There is discoloration present to the upper and lower central incisors bilaterally.  Multiple missing teeth.  No gum swelling or pain with limited palpation.  On my exam, patient bit down on finger, therefore exam was discontinued.   Eyes: Conjunctivae and EOM are normal. Pupils are equal, round, and reactive to light. Right eye exhibits no discharge. Left eye exhibits no discharge.   Neck:   Normal range of motion.  Cardiovascular:  Normal rate and regular rhythm.           No murmur heard.  Pulmonary/Chest: No respiratory distress.   Abdominal: Abdomen is soft. He exhibits no distension. There is no abdominal tenderness.   Musculoskeletal:         General: Normal range of motion.      Cervical back: Normal range of motion.     Neurological: GCS score is 15. GCS eye subscore is 4. GCS verbal subscore is 5. GCS motor subscore is 6.   Patient is alert and oriented to person and place.  Gait stable.  Answering questions appropriately.  No focal neurological deficits.   Skin: Skin is warm and dry.   Psychiatric: He has a normal mood and affect.         ED Course   Procedures  Labs Reviewed - No data to display       Imaging Results    None          Medications - No data to display  Medical Decision Making  Urgent evaluation of 58-year-old male presenting for evaluation of dental problem and fatigue.  Patient requesting place to sleep.  Admits to alcohol use today.  Patient frequently seen in ER  for alcohol abuse.  He is afebrile and hemodynamically stable.  Not complaining of any pain at this time.  He has mildly slurred speech, but is ambulating steadily and answering questions appropriately.  States that he has a safe place to go to [facility] to sleep at his brother's house.  He denies any dental pain, but states that he has been having discoloration to his teeth and would like to see a dentist.  On my exam, there is no visible gum swelling, fluctuance, no facial swelling or erythema. Airway patent. Exam was slightly limited because patient accidentally bit down on my finger during exam and I therefore discontinued my exam.  Patient states that he would just like  information for dental resources.  I see no evidence of acute infection, patient with chronically poor dental hygiene, we will refer to dentist for further evaluation.  No indication for antibiotics at this time.  Patient not express interest in alcohol rehab at this time, but would like to be given resources to obtain treatment.  Patient appears intoxicated but is answering questions appropriately and ambulating steadily, clinically stable for discharge.  I see no indication for further acute emergent workup, patient is stable for discharge.  He was given resources for substance abuse treatment and dental resources, along with primary care clinics at which he could follow up.  I discussed this case with my attending, Dr. Munroe, who was in agreement with plan.                                      Clinical Impression:  Final diagnoses:  [F10.920] Alcoholic intoxication without complication (Primary)  [Z91.89] Poor dental hygiene          ED Disposition Condition    Discharge Stable          ED Prescriptions    None       Follow-up Information       Follow up With Specialties Details Why Contact Info    [facility]  Schedule an appointment as soon as possible for a visit   [address] [facility]  [address]  [phone]      [facility]  Schedule an appointment as soon as possible for a visit   [address] [facility] [id number]    [facility] Emergency Dept Emergency Medicine Go to  If symptoms worsen [address]  [facility] [id number] [phone]             Hanna Silverman PA-C  01/18/24 0102       Hanna Silverman PA-C  01/18/24 0103

## 2024-01-18 NOTE — ED TRIAGE NOTES
sleepy (Reports coming to ED to sleep, endorses going to every custodial house and hospital around the city to get sleep,unsuccessful, pt reports daily alcohol use, last drink today.) patient states he needs help for alcoholism and dental

## 2024-02-09 PROCEDURE — 99284 EMERGENCY DEPT VISIT MOD MDM: CPT

## 2024-02-10 ENCOUNTER — HOSPITAL ENCOUNTER (EMERGENCY)
Facility: HOSPITAL | Age: 59
Discharge: HOME | End: 2024-02-10
Attending: STUDENT IN AN ORGANIZED HEALTH CARE EDUCATION/TRAINING PROGRAM
Payer: MEDICAID

## 2024-02-10 VITALS
DIASTOLIC BLOOD PRESSURE: 70 MMHG | RESPIRATION RATE: 16 BRPM | HEART RATE: 68 BPM | OXYGEN SATURATION: 99 % | BODY MASS INDEX: 23.12 KG/M2 | TEMPERATURE: 98 F | WEIGHT: 156.06 LBS | HEIGHT: 69 IN | SYSTOLIC BLOOD PRESSURE: 118 MMHG

## 2024-02-10 DIAGNOSIS — F19.920 DRUG INTOXICATION WITHOUT COMPLICATION: Primary | ICD-10-CM

## 2024-02-10 DIAGNOSIS — F10.920 ALCOHOLIC INTOXICATION WITHOUT COMPLICATION: ICD-10-CM

## 2024-02-10 LAB
ALBUMIN SERPL BCP-MCNC: 3 G/DL (ref 3.5–5.2)
ALP SERPL-CCNC: 77 U/L (ref 55–135)
ALT SERPL W/O P-5'-P-CCNC: 35 U/L (ref 10–44)
ANION GAP SERPL CALC-SCNC: 11 MMOL/L (ref 8–16)
AST SERPL-CCNC: 77 U/L (ref 10–40)
BASOPHILS # BLD AUTO: 0.02 K/UL (ref 0–0.2)
BASOPHILS NFR BLD: 0.4 % (ref 0–1.9)
BILIRUB SERPL-MCNC: 0.4 MG/DL (ref 0.1–1)
BUN SERPL-MCNC: 7 MG/DL (ref 6–20)
CALCIUM SERPL-MCNC: 9 MG/DL (ref 8.7–10.5)
CHLORIDE SERPL-SCNC: 105 MMOL/L (ref 95–110)
CO2 SERPL-SCNC: 25 MMOL/L (ref 23–29)
CREAT SERPL-MCNC: 0.9 MG/DL (ref 0.5–1.4)
DIFFERENTIAL METHOD BLD: ABNORMAL
EOSINOPHIL # BLD AUTO: 0 K/UL (ref 0–0.5)
EOSINOPHIL NFR BLD: 0.2 % (ref 0–8)
ERYTHROCYTE [DISTWIDTH] IN BLOOD BY AUTOMATED COUNT: 13.7 % (ref 11.5–14.5)
EST. GFR  (NO RACE VARIABLE): >60 ML/MIN/1.73 M^2
GLUCOSE SERPL-MCNC: 86 MG/DL (ref 70–110)
HCT VFR BLD AUTO: 40.2 % (ref 40–54)
HGB BLD-MCNC: 13.5 G/DL (ref 14–18)
IMM GRANULOCYTES # BLD AUTO: 0.01 K/UL (ref 0–0.04)
IMM GRANULOCYTES NFR BLD AUTO: 0.2 % (ref 0–0.5)
LYMPHOCYTES # BLD AUTO: 1.2 K/UL (ref 1–4.8)
LYMPHOCYTES NFR BLD: 20.8 % (ref 18–48)
MCH RBC QN AUTO: 36.5 PG (ref 27–31)
MCHC RBC AUTO-ENTMCNC: 33.6 G/DL (ref 32–36)
MCV RBC AUTO: 109 FL (ref 82–98)
MONOCYTES # BLD AUTO: 0.4 K/UL (ref 0.3–1)
MONOCYTES NFR BLD: 7 % (ref 4–15)
NEUTROPHILS # BLD AUTO: 4.1 K/UL (ref 1.8–7.7)
NEUTROPHILS NFR BLD: 71.4 % (ref 38–73)
NRBC BLD-RTO: 0 /100 WBC
PLATELET # BLD AUTO: 238 K/UL (ref 150–450)
PMV BLD AUTO: 9.4 FL (ref 9.2–12.9)
POTASSIUM SERPL-SCNC: 3.2 MMOL/L (ref 3.5–5.1)
PROT SERPL-MCNC: 7.1 G/DL (ref 6–8.4)
RBC # BLD AUTO: 3.7 M/UL (ref 4.6–6.2)
SODIUM SERPL-SCNC: 141 MMOL/L (ref 136–145)
WBC # BLD AUTO: 5.71 K/UL (ref 3.9–12.7)

## 2024-02-10 PROCEDURE — 63600175 PHARM REV CODE 636 W HCPCS

## 2024-02-10 PROCEDURE — 96374 THER/PROPH/DIAG INJ IV PUSH: CPT

## 2024-02-10 PROCEDURE — 85025 COMPLETE CBC W/AUTO DIFF WBC: CPT

## 2024-02-10 PROCEDURE — 80053 COMPREHEN METABOLIC PANEL: CPT

## 2024-02-10 RX ORDER — NALOXONE HYDROCHLORIDE 1 MG/ML
INJECTION INTRAMUSCULAR; INTRAVENOUS; SUBCUTANEOUS
Status: COMPLETED
Start: 2024-02-10 | End: 2024-02-10

## 2024-02-10 RX ORDER — NALOXONE HYDROCHLORIDE 1 MG/ML
1 INJECTION INTRAMUSCULAR; INTRAVENOUS; SUBCUTANEOUS ONCE
Status: COMPLETED | OUTPATIENT
Start: 2024-02-10 | End: 2024-02-10

## 2024-02-10 RX ADMIN — NALOXONE HYDROCHLORIDE 1 MG: 1 INJECTION INTRAMUSCULAR; INTRAVENOUS; SUBCUTANEOUS at 12:02

## 2024-02-10 RX ADMIN — NALOXONE HYDROCHLORIDE 1 MG: 1 INJECTION PARENTERAL at 12:02

## 2024-02-10 NOTE — ED PROVIDER NOTES
"Encounter Date: 2/9/2024       History     Chief Complaint   Patient presents with    Drug Overdose     Found in parking lot; 1mg narcan and 1 liter of fluid;GCS14     58-year-old male with PMH of substance abuse presents with altered mental status.  History is limited by the condition of the patient.  He admits to drinking a pt of alcohol.  He does not answer many other questions.    Per EMS, patient was found unresponsive and responded 1 mg of Narcan.  They gave 1 L of fluid EN route.    The history is provided by the patient and the EMS personnel. The history is limited by the condition of the patient.     Review of patient's allergies indicates:   Allergen Reactions    Haloperidol Other (See Comments)     "messes with my jaw"  Dystonic reaction       Past Medical History:   Diagnosis Date    Arthritis     Asthma     Chronic alcohol use     Mood disorder      No past surgical history on file.  No family history on file.  Social History     Tobacco Use    Smoking status: Every Day     Types: Cigarettes    Smokeless tobacco: Never   Substance Use Topics    Alcohol use: Yes         Physical Exam     Initial Vitals   BP Pulse Resp Temp SpO2   02/09/24 2256 02/09/24 2256 02/09/24 2256 02/09/24 2258 02/09/24 2256   128/84 90 18 97.7 °F (36.5 °C) 100 %      MAP       --                Physical Exam    Nursing note and vitals reviewed.  Constitutional: He appears well-developed. He is not diaphoretic. No distress.   Sleeping but easily awakes   HENT:   Head: Normocephalic and atraumatic.   Eyes: Conjunctivae and EOM are normal. Pupils are equal, round, and reactive to light.   Neck: Neck supple.   Normal range of motion.  Cardiovascular:  Normal rate, regular rhythm, normal heart sounds and intact distal pulses.           No murmur heard.  Pulmonary/Chest: Breath sounds normal. No respiratory distress. He has no wheezes. He has no rhonchi. He has no rales.   Abdominal: Abdomen is soft. He exhibits no distension. There is " no abdominal tenderness. There is no rebound and no guarding.   Musculoskeletal:         General: No tenderness or edema.      Cervical back: Normal range of motion and neck supple.     Neurological: He is alert and oriented to person, place, and time.   Slurred speech   Skin: Skin is warm and dry. Capillary refill takes less than 2 seconds.         ED Course   Procedures  Labs Reviewed   CBC W/ AUTO DIFFERENTIAL - Abnormal; Notable for the following components:       Result Value    RBC 3.70 (*)     Hemoglobin 13.5 (*)      (*)     MCH 36.5 (*)     All other components within normal limits   COMPREHENSIVE METABOLIC PANEL - Abnormal; Notable for the following components:    Potassium 3.2 (*)     Albumin 3.0 (*)     AST 77 (*)     All other components within normal limits          Imaging Results    None          Medications   naloxone injection 1 mg (1 mg Intravenous Given 2/10/24 0040)     Medical Decision Making  Differential diagnoses considered includes alcohol intoxication, opiate overdose, TEZ, electrolyte abnormality    Naproxen 1 mg given for depressed respiratory status with a positive response.  Patient not require any oxygen. See ED course for additional attending MDM      Amount and/or Complexity of Data Reviewed  Labs: ordered. Decision-making details documented in ED Course.    Risk  Prescription drug management.  Diagnosis or treatment significantly limited by social determinants of health.              Attending Attestation:   Physician Attestation Statement for Resident:  As the supervising MD   Physician Attestation Statement: I have personally seen and examined this patient.   I agree with the above history.  -:   As the supervising MD I agree with the above PE.     As the supervising MD I agree with the above treatment, course, plan, and disposition.   -: Procedure: Critical Care  Please put in 30 minutes of critical care due to patient having a high risk of respiratory failure 2/2 opiate  overdose, gave narcan.     See ED course for additional attending MDM                       ED Course as of 02/10/24 0728   Sat Feb 10, 2024   0050 Patient received 1 mg of Narcan with significant clinical response.  Patient was somnolent with sonorous breath sounds and shallow respirations.  After Narcan patient is now standing up moving around, talking, asking for clean clothes. [BH]   0241 Comprehensive metabolic panel(!)  Mild hypokalemia, otherwise no significant electrolyte derangement or impaired renal function [BD]   0241 CBC auto differential(!)  CBC unremarkable without leukocytosis, significant anemia, or decreased platelets   [BD]   0640 Patient much more alert at this time.  He tolerates p.o..  He ambulates independently without any issues.  He is hemodynamically and clinically stable and like to be discharged at this time. Patient will be discharged at this time. Patient has been given home care instructions, follow up instructions, and strict return precautions. They agree with and are comfortable with the plan.     Procedure: Critical Care  Please put in 30 minutes of critical care due to patient having a high risk of respiratory failure.         [BD]      ED Course User Index  [BD] Mauricio Chan MD  [] Mauricio Osborne MD                           Clinical Impression:  Final diagnoses:  [F19.920] Drug intoxication without complication (Primary)  [F10.920] Alcoholic intoxication without complication          ED Disposition Condition    Discharge Stable          ED Prescriptions    None       Follow-up Information    None          Mauricio Chan MD  02/10/24 0728

## 2024-02-10 NOTE — ED NOTES
Patient arrived to the ED via EMS, patient had a dug over dose was found in a parking lot. Patient received 1 mg of Narcan and 1 bag of fluids on route to ED. Patient seems to be responsive slightly difficult to arouse. Patient sating at 98% at this time ans snoring. Patient has been changed into a gown and connected to cycling blood pressure cuff. Care ongoing.

## 2024-02-10 NOTE — ED NOTES
Pt's clothes placed in pt belongings bad. Shoes at bedside. Pt changed into hospital gown and provided with clean socks.

## 2024-02-10 NOTE — ED NOTES
Pt now alert and speaking, pt able to stand with assist for linen change. Pt moving all extremities.

## 2024-02-10 NOTE — ED NOTES
Patient able to ambulate to restroom without complaints of SOB. Patient has kept a steady gait satting at 98%. Patient has been given sandwiches and juice. Patient's clothes are wet so paper scrubs were given to him to wear out.

## 2024-02-10 NOTE — DISCHARGE INSTRUCTIONS
You were brought in for a drug overdose.  You were treated with naloxone, which treats opioid overdoses.    You should avoid the use of opiates to avoid potentially life-threatening respiratory depression.  You should not mix opiates and alcohol.    Return to the emergency department for any chest pain; shortness of breath; blood in your vomit, urine, or stool; any other new or concerning symptoms.

## 2024-02-10 NOTE — ED NOTES
Pt more difficult to arouse. Bilateral pupils pinpoint. Rocio EVERETT made aware and to bedside. SpO 96% RA. RR 12, snoring.

## 2024-06-05 ENCOUNTER — HOSPITAL ENCOUNTER (EMERGENCY)
Facility: HOSPITAL | Age: 59
Discharge: HOME | End: 2024-06-05
Attending: EMERGENCY MEDICINE
Payer: MEDICAID

## 2024-06-05 VITALS
SYSTOLIC BLOOD PRESSURE: 119 MMHG | DIASTOLIC BLOOD PRESSURE: 70 MMHG | OXYGEN SATURATION: 98 % | HEART RATE: 84 BPM | RESPIRATION RATE: 16 BRPM | TEMPERATURE: 96 F

## 2024-06-05 DIAGNOSIS — F10.920 ALCOHOLIC INTOXICATION WITHOUT COMPLICATION: Primary | ICD-10-CM

## 2024-06-05 LAB — POCT GLUCOSE: 83 MG/DL (ref 70–110)

## 2024-06-05 PROCEDURE — 82962 GLUCOSE BLOOD TEST: CPT

## 2024-06-05 PROCEDURE — 99283 EMERGENCY DEPT VISIT LOW MDM: CPT

## 2024-06-05 NOTE — ED TRIAGE NOTES
"Ben Gutierrez, a 58 y.o. male presents to the ED w/ complaint of alcohol intoxication. Ems was called by some bystanders because pt was drinking and refusing to leave the gas station. Pt is AAOx1. Pt states he only wants to sleep and be left alone, unable to quantify how many drinks he had tonight.     Triage note:  Chief Complaint   Patient presents with    Alcohol Intoxication     Ems called by bystanders d/t pt drinking and refusing to leave gas station. Oriented to self only     Review of patient's allergies indicates:   Allergen Reactions    Haloperidol Other (See Comments)     "messes with my jaw"  Dystonic reaction       Past Medical History:   Diagnosis Date    Arthritis     Asthma     Chronic alcohol use     Mood disorder        "
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
## 2024-06-05 NOTE — PROVIDER PROGRESS NOTES - EMERGENCY DEPT.
Encounter Date: 6/5/2024    ED Physician Progress Notes        Physician Note:   -I received sign-out at 6 am regarding Ben Files  -Patient presented to the ED for etoh intox    -The following medications had been given:  Medications - No data to display    -At the time of sign-out, the following labs/tests/imaging were still pending: clinical reassessment    8:31 AM  Update     Patient reassessed.  He  states he is ready to go home.  He knows where he live, has a plan to go home.  He was ambulating with a steady gait and is clinically sober.  Will discharge.